# Patient Record
Sex: MALE | Race: WHITE | Employment: STUDENT | URBAN - METROPOLITAN AREA
[De-identification: names, ages, dates, MRNs, and addresses within clinical notes are randomized per-mention and may not be internally consistent; named-entity substitution may affect disease eponyms.]

---
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
## 2021-03-02 ENCOUNTER — OFFICE VISIT (OUTPATIENT)
Dept: UROLOGY | Age: 21
End: 2021-03-02
Payer: COMMERCIAL

## 2021-03-02 VITALS
HEART RATE: 89 BPM | BODY MASS INDEX: 22.53 KG/M2 | WEIGHT: 170 LBS | DIASTOLIC BLOOD PRESSURE: 74 MMHG | HEIGHT: 73 IN | SYSTOLIC BLOOD PRESSURE: 126 MMHG

## 2021-03-02 DIAGNOSIS — R31.29 MICROSCOPIC HEMATURIA: Primary | ICD-10-CM

## 2021-03-02 LAB
BILIRUBIN, POC: ABNORMAL
BLOOD URINE, POC: ABNORMAL
CLARITY, POC: CLEAR
COLOR, POC: YELLOW
GLUCOSE URINE, POC: ABNORMAL
KETONES, POC: ABNORMAL
LEUKOCYTE EST, POC: ABNORMAL
NITRITE, POC: ABNORMAL
PH, POC: 5.5
PROTEIN, POC: ABNORMAL
SPECIFIC GRAVITY, POC: 1.03
UROBILINOGEN, POC: 0.2

## 2021-03-02 PROCEDURE — 81003 URINALYSIS AUTO W/O SCOPE: CPT | Performed by: UROLOGY

## 2021-03-02 PROCEDURE — 99204 OFFICE O/P NEW MOD 45 MIN: CPT | Performed by: UROLOGY

## 2021-03-02 RX ORDER — ACETAMINOPHEN 160 MG
TABLET,DISINTEGRATING ORAL DAILY
COMMUNITY

## 2021-03-02 ASSESSMENT — ENCOUNTER SYMPTOMS
GASTROINTESTINAL NEGATIVE: 1
RESPIRATORY NEGATIVE: 1
ALLERGIC/IMMUNOLOGIC NEGATIVE: 1

## 2021-03-02 NOTE — PROGRESS NOTES
Subjective:      Patient ID: Adan Alejandro is a 21 y.o. male. HPI This is a 22 yo male with prior h/o Asthma and student at [employer] who lives in [place] and is here for evaluation of microhematuria. He saw a Urologist in [place] for this problem and at that time had pain after voiding and some urgency. He had a Ct that was reported \"negative\" and was to have cystoscopy but the symptoms resolved and he returned to college. He was seen recently by his PCP in [place] and found to have persistent microhematuria and is here for evaluation. He has no gross hematuria or dysuria or pain. He has no flank pain and no N/V or F/C. He has a good flow and no PVD or frequency or urgency. He has no splaying of the stream. He has no prior  surgical history. He does not smoke ciggs. He has no family h/o  malignancies. He has no other complaints. He does have issues with cost of trip from [place] and would like to coordinate visits when next seen.      Past Medical History:   Diagnosis Date    Asthma      Past Surgical History:   Procedure Laterality Date    WISDOM TOOTH EXTRACTION       Social History     Socioeconomic History    Marital status: Single     Spouse name: None    Number of children: None    Years of education: None    Highest education level: None   Occupational History    None   Social Needs    Financial resource strain: None    Food insecurity     Worry: None     Inability: None    Transportation needs     Medical: None     Non-medical: None   Tobacco Use    Smoking status: Never Smoker    Smokeless tobacco: Never Used   Substance and Sexual Activity    Alcohol use: Not Currently    Drug use: Never    Sexual activity: Not Currently   Lifestyle    Physical activity     Days per week: None     Minutes per session: None    Stress: None   Relationships    Social connections     Talks on phone: None     Gets together: None     Attends Mormon service: None     Active member of club or organization: None Attends meetings of clubs or organizations: None     Relationship status: None    Intimate partner violence     Fear of current or ex partner: None     Emotionally abused: None     Physically abused: None     Forced sexual activity: None   Other Topics Concern    None   Social History Narrative    None     History reviewed. No pertinent family history. Current Outpatient Medications   Medication Sig Dispense Refill    Cholecalciferol (VITAMIN D3) 50 MCG (2000 UT) CAPS Take by mouth daily       No current facility-administered medications for this visit. Patient has no known allergies. reviewed      Review of Systems   Constitutional: Negative. HENT: Negative. Eyes: Positive for visual disturbance. Respiratory: Negative. Cardiovascular: Negative. Gastrointestinal: Negative. Endocrine: Negative. Genitourinary: Negative for decreased urine volume, difficulty urinating, discharge, dysuria, enuresis, flank pain, frequency, genital sores, penile pain, penile swelling, scrotal swelling, testicular pain and urgency. Musculoskeletal: Negative. Skin: Negative. Allergic/Immunologic: Negative. Neurological: Negative. Hematological: Negative. Does not bruise/bleed easily. Psychiatric/Behavioral: Negative. Objective:   Physical Exam  Constitutional:       Appearance: Normal appearance. HENT:      Head: Normocephalic and atraumatic. Eyes:      Conjunctiva/sclera: Conjunctivae normal.   Neck:      Musculoskeletal: Neck supple. No muscular tenderness. Cardiovascular:      Rate and Rhythm: Normal rate and regular rhythm. Heart sounds: Normal heart sounds. No murmur. Pulmonary:      Effort: Pulmonary effort is normal. No respiratory distress. Breath sounds: Normal breath sounds. No stridor. No wheezing, rhonchi or rales. Abdominal:      General: Abdomen is flat. Bowel sounds are normal. There is no distension. Palpations: Abdomen is soft. There is no mass. Tenderness: There is no abdominal tenderness. There is no right CVA tenderness, left CVA tenderness, guarding or rebound. Hernia: No hernia is present. There is no hernia in the left inguinal area or right inguinal area. Genitourinary:     Penis: Normal and circumcised. No phimosis, paraphimosis, hypospadias, erythema, tenderness, discharge, swelling or lesions. Testes:         Right: Mass, tenderness, swelling, testicular hydrocele or varicocele not present. Right testis is descended. Left: Mass, tenderness, swelling, testicular hydrocele or varicocele not present. Left testis is descended. Epididymis:      Right: Not inflamed or enlarged. No mass or tenderness. Left: Not inflamed or enlarged. No mass or tenderness. Neurological:      Mental Status: He is alert. Psychiatric:         Mood and Affect: Mood normal.         Behavior: Behavior normal.         3/2/2021  9:27 AM - Moses Elaine CMA ([facility])    Component Collected Lab   Color, UA 03/02/2021  9:26 AM Unknown   yellow    Clarity, UA 03/02/2021  9:26 AM Unknown   clear    Glucose, UA POC 03/02/2021  9:26 AM Unknown   neg    Bilirubin, UA 03/02/2021  9:26 AM Unknown   neg    Ketones, UA 03/02/2021  9:26 AM Unknown   trace    Spec Grav, UA 03/02/2021  9:26 AM Unknown   1.030    Blood, UA POC 03/02/2021  9:26 AM Unknown   large    pH, UA 03/02/2021  9:26 AM Unknown   5.5    Protein, UA POC 03/02/2021  9:26 AM Unknown   moderate    Urobilinogen, UA 03/02/2021  9:26 AM Unknown   0.2    Leukocytes, UA 03/02/2021  9:26 AM Unknown   neg    Nitrite, UA 03/02/2021  9:26 AM Unknown   neg    Lab and Collection    POCT Urinalysis No Micro (Auto) - 3/2/2021        Assessment: This is a 22 yo male with prior h/o Asthma and with a 1.5 yr h/o of microhematuria and no specific  symptoms. He does have proteinuria noted on U/A today and I explained this may need evaluation by nephrology as well.  I recommend s full hematuria evaluation including CT Urogram and then cystoscopy. The option of local or MAC was discussed and he wants local due to trouble getting here for visit. If unable to tolerate, will then get it done in [place] under [address] so he has family avaialable. The risks and benefits of cystoscopy including but not limited to infection, pain, bleeding, stricture, retention, perforation, need for multiple procedures and he wants to proceed as planned. Plan:      1. CT Urogram   2.  F/U 1-2 weeks for office cystoscopy, local (pt choice) and visit on same day with Nephrology prior for proteinuria and microhematuria        Sylvia Garcia MD

## 2021-03-09 ENCOUNTER — HOSPITAL ENCOUNTER (OUTPATIENT)
Dept: LAB | Age: 21
Discharge: HOME | End: 2021-03-09
Payer: COMMERCIAL

## 2021-03-09 LAB
ALBUMIN SERPL-MCNC: 4.4 G/DL (ref 3.5–4.6)
ANION GAP SERPL CALCULATED.3IONS-SCNC: 13 MEQ/L (ref 9–15)
BACTERIA: NEGATIVE /HPF
BILIRUBIN URINE: NEGATIVE
BLOOD, URINE: ABNORMAL
BUN BLDV-MCNC: 9 MG/DL (ref 6–20)
CALCIUM SERPL-MCNC: 9.4 MG/DL (ref 8.5–9.9)
CHLORIDE BLD-SCNC: 105 MEQ/L (ref 95–107)
CLARITY: CLEAR
CO2: 24 MEQ/L (ref 20–31)
COLOR: YELLOW
CREAT SERPL-MCNC: 0.82 MG/DL (ref 0.7–1.2)
CREATININE URINE: 222.9 MG/DL
EPITHELIAL CELLS, UA: ABNORMAL /HPF (ref 0–5)
GFR AFRICAN AMERICAN: >60
GFR NON-AFRICAN AMERICAN: >60
GLUCOSE BLD-MCNC: 87 MG/DL (ref 70–99)
GLUCOSE URINE: NEGATIVE MG/DL
HCT VFR BLD CALC: 44 % (ref 42–52)
HEMOGLOBIN: 15 G/DL (ref 14–18)
HYALINE CASTS: ABNORMAL /HPF (ref 0–5)
KETONES, URINE: NEGATIVE MG/DL
LEUKOCYTE ESTERASE, URINE: NEGATIVE
MCH RBC QN AUTO: 28.9 PG (ref 27–31.3)
MCHC RBC AUTO-ENTMCNC: 34.1 % (ref 33–37)
MCV RBC AUTO: 84.5 FL (ref 80–100)
NITRITE, URINE: NEGATIVE
PDW BLD-RTO: 12.6 % (ref 11.5–14.5)
PH UA: 5.5 (ref 5–9)
PHOSPHORUS: 3.5 MG/DL (ref 2.3–4.8)
PLATELET # BLD: 243 K/UL (ref 130–400)
POTASSIUM SERPL-SCNC: 4.4 MEQ/L (ref 3.4–4.9)
PROTEIN PROTEIN: 33 MG/DL
PROTEIN UA: 30 MG/DL
PROTEIN/CREAT RATIO: 0.1 ML/ML
PROTEIN/CREAT RATIO: 0.1 ML/ML (ref 0–0.2)
RBC # BLD: 5.21 M/UL (ref 4.7–6.1)
RBC UA: ABNORMAL /HPF (ref 0–5)
SODIUM BLD-SCNC: 142 MEQ/L (ref 135–144)
SPECIFIC GRAVITY UA: 1.02 (ref 1–1.03)
UROBILINOGEN, URINE: 0.2 E.U./DL
WBC # BLD: 4.3 K/UL (ref 4.5–11)
WBC UA: ABNORMAL /HPF (ref 0–5)

## 2021-03-09 PROCEDURE — 87086 URINE CULTURE/COLONY COUNT: CPT

## 2021-03-09 PROCEDURE — 80069 RENAL FUNCTION PANEL: CPT

## 2021-03-09 PROCEDURE — 85027 COMPLETE CBC AUTOMATED: CPT

## 2021-03-09 PROCEDURE — 36415 COLL VENOUS BLD VENIPUNCTURE: CPT

## 2021-03-09 PROCEDURE — 84156 ASSAY OF PROTEIN URINE: CPT

## 2021-03-09 PROCEDURE — 81001 URINALYSIS AUTO W/SCOPE: CPT

## 2021-03-11 LAB — URINE CULTURE, ROUTINE: NORMAL

## 2021-03-12 ENCOUNTER — HOSPITAL ENCOUNTER (EMERGENCY)
Age: 21
Discharge: ANOTHER HOSPITAL | End: 2021-03-12
Attending: EMERGENCY MEDICINE
Payer: COMMERCIAL

## 2021-03-12 ENCOUNTER — HOSPITAL ENCOUNTER (OUTPATIENT)
Dept: CT IMAGING | Age: 21
Discharge: HOME | End: 2021-03-14
Payer: COMMERCIAL

## 2021-03-12 ENCOUNTER — HOSPITAL ENCOUNTER (OUTPATIENT)
Age: 21
Setting detail: OBSERVATION
Discharge: HOME | End: 2021-03-14
Attending: SURGERY | Admitting: SURGERY
Payer: COMMERCIAL

## 2021-03-12 ENCOUNTER — TELEPHONE (OUTPATIENT)
Dept: UROLOGY | Age: 21
End: 2021-03-12

## 2021-03-12 VITALS
DIASTOLIC BLOOD PRESSURE: 109 MMHG | RESPIRATION RATE: 18 BRPM | HEART RATE: 123 BPM | WEIGHT: 170 LBS | OXYGEN SATURATION: 97 % | SYSTOLIC BLOOD PRESSURE: 151 MMHG | HEIGHT: 72 IN | BODY MASS INDEX: 23.03 KG/M2 | TEMPERATURE: 98.5 F

## 2021-03-12 DIAGNOSIS — K35.30 ACUTE APPENDICITIS WITH LOCALIZED PERITONITIS, WITHOUT PERFORATION, ABSCESS, OR GANGRENE: Primary | ICD-10-CM

## 2021-03-12 DIAGNOSIS — R31.29 MICROSCOPIC HEMATURIA: ICD-10-CM

## 2021-03-12 DIAGNOSIS — R10.31 RIGHT LOWER QUADRANT ABDOMINAL PAIN: Primary | ICD-10-CM

## 2021-03-12 LAB
ALBUMIN SERPL-MCNC: 4.9 G/DL (ref 3.5–4.6)
ALP BLD-CCNC: 93 U/L (ref 35–104)
ALT SERPL-CCNC: 13 U/L (ref 0–41)
ANION GAP SERPL CALCULATED.3IONS-SCNC: 11 MEQ/L (ref 9–15)
AST SERPL-CCNC: 13 U/L (ref 0–40)
BACTERIA: NEGATIVE /HPF
BASOPHILS ABSOLUTE: 0 K/UL (ref 0–0.2)
BASOPHILS RELATIVE PERCENT: 0.4 %
BILIRUB SERPL-MCNC: 0.8 MG/DL (ref 0.2–0.7)
BILIRUBIN URINE: ABNORMAL
BLOOD, URINE: ABNORMAL
BUN BLDV-MCNC: 11 MG/DL (ref 6–20)
CALCIUM SERPL-MCNC: 10 MG/DL (ref 8.5–9.9)
CHLORIDE BLD-SCNC: 100 MEQ/L (ref 95–107)
CLARITY: ABNORMAL
CO2: 28 MEQ/L (ref 20–31)
COLOR: ABNORMAL
CREAT SERPL-MCNC: 0.99 MG/DL (ref 0.7–1.2)
EOSINOPHILS ABSOLUTE: 0.2 K/UL (ref 0–0.7)
EOSINOPHILS RELATIVE PERCENT: 3 %
EPITHELIAL CELLS, UA: ABNORMAL /HPF
GFR AFRICAN AMERICAN: >60
GFR NON-AFRICAN AMERICAN: >60
GLOBULIN: 3.1 G/DL (ref 2.3–3.5)
GLUCOSE BLD-MCNC: 119 MG/DL (ref 70–99)
GLUCOSE URINE: NEGATIVE MG/DL
HCT VFR BLD CALC: 43.7 % (ref 42–52)
HEMOGLOBIN: 15.1 G/DL (ref 14–18)
KETONES, URINE: NEGATIVE MG/DL
LEUKOCYTE ESTERASE, URINE: NEGATIVE
LYMPHOCYTES ABSOLUTE: 1.6 K/UL (ref 1–4.8)
LYMPHOCYTES RELATIVE PERCENT: 19.4 %
MCH RBC QN AUTO: 28.8 PG (ref 27–31.3)
MCHC RBC AUTO-ENTMCNC: 34.6 % (ref 33–37)
MCV RBC AUTO: 83.3 FL (ref 80–100)
MONOCYTES ABSOLUTE: 0.9 K/UL (ref 0.2–0.8)
MONOCYTES RELATIVE PERCENT: 10.6 %
NEUTROPHILS ABSOLUTE: 5.3 K/UL (ref 1.4–6.5)
NEUTROPHILS RELATIVE PERCENT: 66.6 %
NITRITE, URINE: NEGATIVE
PDW BLD-RTO: 11.8 % (ref 11.5–14.5)
PH UA: 6 (ref 5–9)
PLATELET # BLD: 258 K/UL (ref 130–400)
POTASSIUM SERPL-SCNC: 3.9 MEQ/L (ref 3.4–4.9)
PROTEIN UA: >=300 MG/DL
RBC # BLD: 5.25 M/UL (ref 4.7–6.1)
RBC UA: >100 /HPF (ref 0–2)
SARS-COV-2, NAAT: NOT DETECTED
SEDIMENTATION RATE, ERYTHROCYTE: 14 MM (ref 0–10)
SODIUM BLD-SCNC: 139 MEQ/L (ref 135–144)
SPECIFIC GRAVITY UA: 1.01 (ref 1–1.03)
TOTAL PROTEIN: 8 G/DL (ref 6.3–8)
URINE REFLEX TO CULTURE: ABNORMAL
UROBILINOGEN, URINE: 0.2 E.U./DL
WBC # BLD: 8 K/UL (ref 4.5–11)
WBC UA: ABNORMAL /HPF (ref 0–5)

## 2021-03-12 PROCEDURE — 2580000003 HC RX 258: Performed by: EMERGENCY MEDICINE

## 2021-03-12 PROCEDURE — 6360000002 HC RX W HCPCS: Performed by: SURGERY

## 2021-03-12 PROCEDURE — 85652 RBC SED RATE AUTOMATED: CPT

## 2021-03-12 PROCEDURE — 87635 SARS-COV-2 COVID-19 AMP PRB: CPT

## 2021-03-12 PROCEDURE — 81001 URINALYSIS AUTO W/SCOPE: CPT

## 2021-03-12 PROCEDURE — G0378 HOSPITAL OBSERVATION PER HR: HCPCS

## 2021-03-12 PROCEDURE — 96365 THER/PROPH/DIAG IV INF INIT: CPT

## 2021-03-12 PROCEDURE — 74178 CT ABD&PLV WO CNTR FLWD CNTR: CPT

## 2021-03-12 PROCEDURE — 6360000004 HC RX CONTRAST MEDICATION: Performed by: UROLOGY

## 2021-03-12 PROCEDURE — 99284 EMERGENCY DEPT VISIT MOD MDM: CPT

## 2021-03-12 PROCEDURE — 6370000000 HC RX 637 (ALT 250 FOR IP): Performed by: SURGERY

## 2021-03-12 PROCEDURE — 36415 COLL VENOUS BLD VENIPUNCTURE: CPT

## 2021-03-12 PROCEDURE — 80053 COMPREHEN METABOLIC PANEL: CPT

## 2021-03-12 PROCEDURE — 85025 COMPLETE CBC W/AUTO DIFF WBC: CPT

## 2021-03-12 PROCEDURE — 2580000003 HC RX 258: Performed by: SURGERY

## 2021-03-12 PROCEDURE — G0379 DIRECT REFER HOSPITAL OBSERV: HCPCS

## 2021-03-12 RX ORDER — 0.9 % SODIUM CHLORIDE 0.9 %
500 INTRAVENOUS SOLUTION INTRAVENOUS ONCE
Status: COMPLETED | OUTPATIENT
Start: 2021-03-12 | End: 2021-03-12

## 2021-03-12 RX ORDER — ACETAMINOPHEN 325 MG/1
650 TABLET ORAL EVERY 6 HOURS PRN
Status: DISCONTINUED | OUTPATIENT
Start: 2021-03-12 | End: 2021-03-14 | Stop reason: HOSPADM

## 2021-03-12 RX ORDER — SODIUM CHLORIDE 9 MG/ML
INJECTION, SOLUTION INTRAVENOUS CONTINUOUS
Status: DISCONTINUED | OUTPATIENT
Start: 2021-03-12 | End: 2021-03-12 | Stop reason: HOSPADM

## 2021-03-12 RX ORDER — MORPHINE SULFATE 2 MG/ML
1 INJECTION, SOLUTION INTRAMUSCULAR; INTRAVENOUS EVERY 4 HOURS PRN
Status: DISCONTINUED | OUTPATIENT
Start: 2021-03-12 | End: 2021-03-13

## 2021-03-12 RX ORDER — CLINDAMYCIN PHOSPHATE AND TRETINOIN 10; .25 MG/G; MG/G
GEL TOPICAL DAILY
COMMUNITY

## 2021-03-12 RX ORDER — DEXTROSE AND SODIUM CHLORIDE 5; .45 G/100ML; G/100ML
INJECTION, SOLUTION INTRAVENOUS CONTINUOUS
Status: DISCONTINUED | OUTPATIENT
Start: 2021-03-12 | End: 2021-03-13

## 2021-03-12 RX ORDER — ONDANSETRON 2 MG/ML
4 INJECTION INTRAMUSCULAR; INTRAVENOUS EVERY 6 HOURS PRN
Status: DISCONTINUED | OUTPATIENT
Start: 2021-03-12 | End: 2021-03-13

## 2021-03-12 RX ADMIN — SODIUM CHLORIDE 100 ML/HR: 9 INJECTION, SOLUTION INTRAVENOUS at 19:35

## 2021-03-12 RX ADMIN — PIPERACILLIN AND TAZOBACTAM 3375 MG: 3; .375 INJECTION, POWDER, FOR SOLUTION INTRAVENOUS at 22:38

## 2021-03-12 RX ADMIN — SODIUM CHLORIDE 500 ML: 9 INJECTION, SOLUTION INTRAVENOUS at 16:54

## 2021-03-12 RX ADMIN — DEXTROSE AND SODIUM CHLORIDE: 5; 450 INJECTION, SOLUTION INTRAVENOUS at 22:37

## 2021-03-12 RX ADMIN — ACETAMINOPHEN 650 MG: 325 TABLET ORAL at 22:38

## 2021-03-12 RX ADMIN — IOPAMIDOL 100 ML: 755 INJECTION, SOLUTION INTRAVENOUS at 13:01

## 2021-03-12 ASSESSMENT — ENCOUNTER SYMPTOMS
STRIDOR: 0
WHEEZING: 0
CHEST TIGHTNESS: 0
DIARRHEA: 0
SORE THROAT: 0
COUGH: 0
SHORTNESS OF BREATH: 0
FACIAL SWELLING: 0
VOICE CHANGE: 0
EYE DISCHARGE: 0
BACK PAIN: 0
ABDOMINAL PAIN: 1
CHOKING: 0
SINUS PRESSURE: 0
EYE PAIN: 0
CONSTIPATION: 0
VOMITING: 0
EYE REDNESS: 0
TROUBLE SWALLOWING: 0
BLOOD IN STOOL: 0

## 2021-03-12 ASSESSMENT — PAIN SCALES - GENERAL: PAINLEVEL_OUTOF10: 4

## 2021-03-12 NOTE — ED NOTES
Elier Gamez from transfer center called, transport will be by [facility], approx. ETA 8:45pm (earliest transport available).   Garrett Rodrigues  03/12/21 4192

## 2021-03-12 NOTE — ED PROVIDER NOTES
[facility] ED  eMERGENCY dEPARTMENT eNCOUnter      Pt Name: Dioni Griffith  MRN: [id number]  [place] 2000  Date of evaluation: 3/12/2021  Provider: Tiffany Kruger MD    [address] [place] [address]       Chief Complaint   Patient presents with    Abdominal Pain     sent by his PCP-possible appendicitis       HISTORY OF PRESENT ILLNESS   (Location/Symptom, Timing/Onset,Context/Setting, Quality, Duration, Modifying Factors, Severity)  Note limiting factors. Dioni Griffith is a 21 y.o. male who presents to the emergency department patient is a student from the [employer] originally from [facility] where he saw his doctor who ordered some urine check and referred to urology because of urinary symptoms no fever no chills no previous abdominal surgery denies any penile discharge patient underwent a CAT scan which was read as a suspected appendicitis and his doctors advised him to go to the emergency so patient came here for further evaluation has abdominal discomfort for the last 3 days time but eating drinking fine no nausea no vomiting no fever no chills no change in taste or smell no contact with any coronavirus    HPI    NursingNotes were reviewed. REVIEW OF SYSTEMS    (2-9 systems for level 4, 10 or more for level 5)     Review of Systems   Constitutional: Negative. Negative for activity change and fever. HENT: Negative for congestion, drooling, facial swelling, mouth sores, nosebleeds, sinus pressure, sore throat, trouble swallowing and voice change. Eyes: Negative for pain, discharge, redness and visual disturbance. Respiratory: Negative for cough, choking, chest tightness, shortness of breath, wheezing and stridor. Cardiovascular: Negative for chest pain, palpitations and leg swelling. Gastrointestinal: Positive for abdominal pain. Negative for blood in stool, constipation, diarrhea and vomiting. Endocrine: Negative for cold intolerance, polyphagia and polyuria.    Genitourinary: Negative for Active member of club or organization: None     Attends meetings of clubs or organizations: None     Relationship status: None    Intimate partner violence     Fear of current or ex partner: None     Emotionally abused: None     Physically abused: None     Forced sexual activity: None   Other Topics Concern    None   Social History Narrative    None       SCREENINGS      @FLOW([id number])@      PHYSICAL EXAM    (up to 7 for level 4, 8 or more for level 5)     ED Triage Vitals [03/12/21 1620]   BP Temp Temp Source Pulse Resp SpO2 Height Weight   (!) 151/109 98.5 °F (36.9 °C) Oral 123 18 97 % 6' (1.829 m) 170 lb (77.1 kg)       Physical Exam  Vitals signs and nursing note reviewed. Constitutional:       General: He is in acute distress. Appearance: He is well-developed. He is not ill-appearing, toxic-appearing or diaphoretic. Comments: Alert cooperative patient slightly anxious this time uncomfortable because of the upper abdominal pain   HENT:      Head: Normocephalic and atraumatic. Mouth/Throat:      Pharynx: Oropharynx is clear. No pharyngeal swelling. Eyes:      General: No scleral icterus. Extraocular Movements: Extraocular movements intact. Pupils: Pupils are equal, round, and reactive to light. Neck:      Musculoskeletal: Neck supple. Cardiovascular:      Rate and Rhythm: Normal rate and regular rhythm. Heart sounds: Normal heart sounds. No murmur. No friction rub. No gallop. Pulmonary:      Effort: No respiratory distress. Breath sounds: Normal breath sounds. No wheezing. Abdominal:      General: Abdomen is flat. Bowel sounds are normal. There is no distension or abdominal bruit. Palpations: Abdomen is soft. There is no shifting dullness, fluid wave, hepatomegaly, splenomegaly, mass or pulsatile mass. Tenderness: There is abdominal tenderness in the epigastric area and left upper quadrant. There is no right CVA tenderness, guarding or rebound. Negative signs include Espinoza's sign, McBurney's sign and psoas sign. Hernia: There is no hernia in the umbilical area, ventral area or left femoral area. Musculoskeletal: Normal range of motion. General: No tenderness. Skin:     General: Skin is warm. Capillary Refill: Capillary refill takes less than 2 seconds. Findings: No erythema or rash. Neurological:      General: No focal deficit present. Mental Status: He is alert and oriented to person, place, and time. Cranial Nerves: No cranial nerve deficit. Motor: No abnormal muscle tone. Psychiatric:         Behavior: Behavior normal.         Thought Content: Thought content normal.         DIAGNOSTIC RESULTS     EKG: All EKG's are interpreted by the Emergency Department Physician who either signs or Co-signsthis chart in the absence of a cardiologist.        RADIOLOGY:   [place] such as CT, Ultrasound and MRI are read by the radiologist. Doris Mcgregor radiographic images are visualized and preliminarily interpreted by the emergency physician with the below findings:        Interpretation per the Radiologist below, if available at the time ofthis note:    No orders to display         ED BEDSIDE ULTRASOUND:   Performed by ED Physician - none    LABS:  Labs Reviewed   COMPREHENSIVE METABOLIC PANEL - Abnormal; Notable for the following components:       Result Value    Glucose 119 (*)     Calcium 10.0 (*)     Albumin 4.9 (*)     Total Bilirubin 0.8 (*)     All other components within normal limits   CBC WITH AUTO DIFFERENTIAL - Abnormal; Notable for the following components:    Monocytes Absolute 0.9 (*)     All other components within normal limits   COVID-19, RAPID   URINE RT REFLEX TO CULTURE       All other labs were within normal range or not returned as of this dictation.     EMERGENCY DEPARTMENT COURSE and DIFFERENTIAL DIAGNOSIS/MDM:   Vitals:    Vitals:    03/12/21 1620   BP: (!) 151/109   Pulse: 123   Resp: 18 Temp: 98.5 °F (36.9 °C)   TempSrc: Oral   SpO2: 97%   Weight: 170 lb (77.1 kg)   Height: 6' (1.829 m)           MDM  Number of Diagnoses or Management Options  Right lower quadrant abdominal pain  Diagnosis management comments: Patient resting comfortably at this time no acute distress on physical examination patient has mild deep palpation and has tenderness to the right lower abdomen no rebound tenderness at this time no CVA tenderness Case discussed with the surgeon on-call Dr. Pastora Antonio, patient to be transferred to [facility] in observation status on MedSurg bed transfer center involved patient aware of the situation       Amount and/or Complexity of Data Reviewed  Clinical lab tests: reviewed and ordered        CRITICAL CARE TIME   Total Critical Care time was minutes, excluding separately reportableprocedures. There was a high probability of clinicallysignificant/life threatening deterioration in the patient's condition which required my urgent intervention. ONSULTS:  None    PROCEDURES:  Unless otherwise noted below, none     Procedures    FINAL IMPRESSION      1. Right lower quadrant abdominal pain          DISPOSITION/PLAN   DISPOSITION        PATIENT REFERRED TO:  No follow-up provider specified.     DISCHARGE MEDICATIONS:  New Prescriptions    No medications on file          (Please note that portions of this note were completed with a voice recognition program.  Efforts were made to edit the dictations but occasionally words are mis-transcribed.)    Miesha Mcnally MD (electronically signed)  Attending Emergency Physician       Miesha Mcnally MD  03/12/21 8190       Miesha Mcnally MD  03/12/21 8004

## 2021-03-12 NOTE — ED NOTES
Yareli from [facility] called with Dr. Diane Hook on the phone for Dr. Kim Eisenmenger.   Dara Lopez      Bety Monroy  03/12/21 8303

## 2021-03-12 NOTE — ED NOTES
Called transfer center , talked to Jeff Mitchell,  She will page Dr. Niels Victor for Dr. Nalini Boswell for admission to [facility].   [facility] [place] Edyta Moon  03/12/21 [address] [facility]  03/12/21 4329

## 2021-03-12 NOTE — ED TRIAGE NOTES
Pt arrives to ED, from [facility]. Pt was sent to ED at the advice of his PCP d/t abnormalities detected on a CT of his abdomen. CT was completed today.

## 2021-03-13 ENCOUNTER — ANESTHESIA (OUTPATIENT)
Dept: OPERATING ROOM | Age: 21
End: 2021-03-13
Payer: COMMERCIAL

## 2021-03-13 ENCOUNTER — ANESTHESIA EVENT (OUTPATIENT)
Dept: OPERATING ROOM | Age: 21
End: 2021-03-13
Payer: COMMERCIAL

## 2021-03-13 VITALS
OXYGEN SATURATION: 100 % | SYSTOLIC BLOOD PRESSURE: 140 MMHG | RESPIRATION RATE: 8 BRPM | TEMPERATURE: 65.3 F | DIASTOLIC BLOOD PRESSURE: 78 MMHG

## 2021-03-13 LAB
HCT VFR BLD CALC: 40.9 % (ref 42–52)
HEMOGLOBIN: 14.2 G/DL (ref 14–18)
MCH RBC QN AUTO: 28.9 PG (ref 27–31.3)
MCHC RBC AUTO-ENTMCNC: 34.7 % (ref 33–37)
MCV RBC AUTO: 83.2 FL (ref 80–100)
PDW BLD-RTO: 12.4 % (ref 11.5–14.5)
PLATELET # BLD: 212 K/UL (ref 130–400)
RBC # BLD: 4.91 M/UL (ref 4.7–6.1)
WBC # BLD: 5.9 K/UL (ref 4.5–11)

## 2021-03-13 PROCEDURE — 2580000003 HC RX 258: Performed by: SURGERY

## 2021-03-13 PROCEDURE — 36415 COLL VENOUS BLD VENIPUNCTURE: CPT

## 2021-03-13 PROCEDURE — 2580000003 HC RX 258: Performed by: STUDENT IN AN ORGANIZED HEALTH CARE EDUCATION/TRAINING PROGRAM

## 2021-03-13 PROCEDURE — 7100000001 HC PACU RECOVERY - ADDTL 15 MIN: Performed by: SURGERY

## 2021-03-13 PROCEDURE — 88304 TISSUE EXAM BY PATHOLOGIST: CPT

## 2021-03-13 PROCEDURE — 6360000002 HC RX W HCPCS: Performed by: STUDENT IN AN ORGANIZED HEALTH CARE EDUCATION/TRAINING PROGRAM

## 2021-03-13 PROCEDURE — 96366 THER/PROPH/DIAG IV INF ADDON: CPT

## 2021-03-13 PROCEDURE — 3600000004 HC SURGERY LEVEL 4 BASE: Performed by: SURGERY

## 2021-03-13 PROCEDURE — 3700000000 HC ANESTHESIA ATTENDED CARE: Performed by: SURGERY

## 2021-03-13 PROCEDURE — 3700000001 HC ADD 15 MINUTES (ANESTHESIA): Performed by: SURGERY

## 2021-03-13 PROCEDURE — 96375 TX/PRO/DX INJ NEW DRUG ADDON: CPT

## 2021-03-13 PROCEDURE — 2500000003 HC RX 250 WO HCPCS: Performed by: STUDENT IN AN ORGANIZED HEALTH CARE EDUCATION/TRAINING PROGRAM

## 2021-03-13 PROCEDURE — 99219 PR INITIAL OBSERVATION CARE/DAY 50 MINUTES: CPT | Performed by: SURGERY

## 2021-03-13 PROCEDURE — 2720000010 HC SURG SUPPLY STERILE: Performed by: SURGERY

## 2021-03-13 PROCEDURE — 2709999900 HC NON-CHARGEABLE SUPPLY: Performed by: SURGERY

## 2021-03-13 PROCEDURE — 6370000000 HC RX 637 (ALT 250 FOR IP): Performed by: SURGERY

## 2021-03-13 PROCEDURE — 7100000000 HC PACU RECOVERY - FIRST 15 MIN: Performed by: SURGERY

## 2021-03-13 PROCEDURE — 85027 COMPLETE CBC AUTOMATED: CPT

## 2021-03-13 PROCEDURE — G0378 HOSPITAL OBSERVATION PER HR: HCPCS

## 2021-03-13 PROCEDURE — 64488 TAP BLOCK BI INJECTION: CPT | Performed by: STUDENT IN AN ORGANIZED HEALTH CARE EDUCATION/TRAINING PROGRAM

## 2021-03-13 PROCEDURE — 3600000014 HC SURGERY LEVEL 4 ADDTL 15MIN: Performed by: SURGERY

## 2021-03-13 PROCEDURE — 44970 LAPAROSCOPY APPENDECTOMY: CPT | Performed by: SURGERY

## 2021-03-13 PROCEDURE — 6360000002 HC RX W HCPCS: Performed by: SURGERY

## 2021-03-13 RX ORDER — SODIUM CHLORIDE 0.9 % (FLUSH) 0.9 %
10 SYRINGE (ML) INJECTION PRN
Status: DISCONTINUED | OUTPATIENT
Start: 2021-03-13 | End: 2021-03-14 | Stop reason: HOSPADM

## 2021-03-13 RX ORDER — TRAMADOL HYDROCHLORIDE 50 MG/1
50 TABLET ORAL EVERY 6 HOURS PRN
Status: DISCONTINUED | OUTPATIENT
Start: 2021-03-13 | End: 2021-03-14 | Stop reason: HOSPADM

## 2021-03-13 RX ORDER — PROPOFOL 10 MG/ML
INJECTION, EMULSION INTRAVENOUS PRN
Status: DISCONTINUED | OUTPATIENT
Start: 2021-03-13 | End: 2021-03-13 | Stop reason: SDUPTHER

## 2021-03-13 RX ORDER — SUCCINYLCHOLINE/SOD CL,ISO/PF 100 MG/5ML
SYRINGE (ML) INTRAVENOUS PRN
Status: DISCONTINUED | OUTPATIENT
Start: 2021-03-13 | End: 2021-03-13 | Stop reason: SDUPTHER

## 2021-03-13 RX ORDER — MEPERIDINE HYDROCHLORIDE 25 MG/ML
12.5 INJECTION INTRAMUSCULAR; INTRAVENOUS; SUBCUTANEOUS EVERY 5 MIN PRN
Status: DISCONTINUED | OUTPATIENT
Start: 2021-03-13 | End: 2021-03-13

## 2021-03-13 RX ORDER — MIDAZOLAM HYDROCHLORIDE 2 MG/2ML
INJECTION, SOLUTION INTRAMUSCULAR; INTRAVENOUS PRN
Status: DISCONTINUED | OUTPATIENT
Start: 2021-03-13 | End: 2021-03-13 | Stop reason: SDUPTHER

## 2021-03-13 RX ORDER — ONDANSETRON 2 MG/ML
INJECTION INTRAMUSCULAR; INTRAVENOUS PRN
Status: DISCONTINUED | OUTPATIENT
Start: 2021-03-13 | End: 2021-03-13 | Stop reason: SDUPTHER

## 2021-03-13 RX ORDER — DEXAMETHASONE SODIUM PHOSPHATE 10 MG/ML
INJECTION INTRAMUSCULAR; INTRAVENOUS PRN
Status: DISCONTINUED | OUTPATIENT
Start: 2021-03-13 | End: 2021-03-13 | Stop reason: SDUPTHER

## 2021-03-13 RX ORDER — ONDANSETRON 2 MG/ML
4 INJECTION INTRAMUSCULAR; INTRAVENOUS EVERY 6 HOURS PRN
Status: DISCONTINUED | OUTPATIENT
Start: 2021-03-13 | End: 2021-03-14 | Stop reason: HOSPADM

## 2021-03-13 RX ORDER — MAGNESIUM HYDROXIDE 1200 MG/15ML
LIQUID ORAL CONTINUOUS PRN
Status: COMPLETED | OUTPATIENT
Start: 2021-03-13 | End: 2021-03-13

## 2021-03-13 RX ORDER — FENTANYL CITRATE 50 UG/ML
50 INJECTION, SOLUTION INTRAMUSCULAR; INTRAVENOUS EVERY 10 MIN PRN
Status: DISCONTINUED | OUTPATIENT
Start: 2021-03-13 | End: 2021-03-13

## 2021-03-13 RX ORDER — ONDANSETRON 2 MG/ML
4 INJECTION INTRAMUSCULAR; INTRAVENOUS
Status: DISCONTINUED | OUTPATIENT
Start: 2021-03-13 | End: 2021-03-13

## 2021-03-13 RX ORDER — SODIUM CHLORIDE, SODIUM LACTATE, POTASSIUM CHLORIDE, CALCIUM CHLORIDE 600; 310; 30; 20 MG/100ML; MG/100ML; MG/100ML; MG/100ML
INJECTION, SOLUTION INTRAVENOUS
Status: COMPLETED
Start: 2021-03-13 | End: 2021-03-13

## 2021-03-13 RX ORDER — SODIUM CHLORIDE 0.9 % (FLUSH) 0.9 %
10 SYRINGE (ML) INJECTION EVERY 12 HOURS SCHEDULED
Status: DISCONTINUED | OUTPATIENT
Start: 2021-03-13 | End: 2021-03-14 | Stop reason: HOSPADM

## 2021-03-13 RX ORDER — ROCURONIUM BROMIDE 10 MG/ML
INJECTION, SOLUTION INTRAVENOUS PRN
Status: DISCONTINUED | OUTPATIENT
Start: 2021-03-13 | End: 2021-03-13 | Stop reason: SDUPTHER

## 2021-03-13 RX ORDER — SODIUM CHLORIDE 0.9 % (FLUSH) 0.9 %
10 SYRINGE (ML) INJECTION PRN
Status: CANCELLED | OUTPATIENT
Start: 2021-03-13

## 2021-03-13 RX ORDER — KETOROLAC TROMETHAMINE 30 MG/ML
INJECTION, SOLUTION INTRAMUSCULAR; INTRAVENOUS PRN
Status: DISCONTINUED | OUTPATIENT
Start: 2021-03-13 | End: 2021-03-13 | Stop reason: SDUPTHER

## 2021-03-13 RX ORDER — DIPHENHYDRAMINE HYDROCHLORIDE 50 MG/ML
12.5 INJECTION INTRAMUSCULAR; INTRAVENOUS
Status: DISCONTINUED | OUTPATIENT
Start: 2021-03-13 | End: 2021-03-13

## 2021-03-13 RX ORDER — SODIUM CHLORIDE 0.9 % (FLUSH) 0.9 %
10 SYRINGE (ML) INJECTION EVERY 12 HOURS SCHEDULED
Status: CANCELLED | OUTPATIENT
Start: 2021-03-13

## 2021-03-13 RX ORDER — HYDROCODONE BITARTRATE AND ACETAMINOPHEN 5; 325 MG/1; MG/1
1 TABLET ORAL PRN
Status: DISCONTINUED | OUTPATIENT
Start: 2021-03-13 | End: 2021-03-13

## 2021-03-13 RX ORDER — KETOROLAC TROMETHAMINE 15 MG/ML
15 INJECTION, SOLUTION INTRAMUSCULAR; INTRAVENOUS EVERY 8 HOURS PRN
Status: DISCONTINUED | OUTPATIENT
Start: 2021-03-13 | End: 2021-03-14 | Stop reason: HOSPADM

## 2021-03-13 RX ORDER — ROPIVACAINE HYDROCHLORIDE 5 MG/ML
INJECTION, SOLUTION EPIDURAL; INFILTRATION; PERINEURAL
Status: COMPLETED | OUTPATIENT
Start: 2021-03-13 | End: 2021-03-13

## 2021-03-13 RX ORDER — SODIUM CHLORIDE, SODIUM LACTATE, POTASSIUM CHLORIDE, CALCIUM CHLORIDE 600; 310; 30; 20 MG/100ML; MG/100ML; MG/100ML; MG/100ML
INJECTION, SOLUTION INTRAVENOUS CONTINUOUS
Status: CANCELLED | OUTPATIENT
Start: 2021-03-13

## 2021-03-13 RX ORDER — FENTANYL CITRATE 50 UG/ML
INJECTION, SOLUTION INTRAMUSCULAR; INTRAVENOUS PRN
Status: DISCONTINUED | OUTPATIENT
Start: 2021-03-13 | End: 2021-03-13 | Stop reason: SDUPTHER

## 2021-03-13 RX ORDER — LIDOCAINE HYDROCHLORIDE AND EPINEPHRINE 10; 10 MG/ML; UG/ML
INJECTION, SOLUTION INFILTRATION; PERINEURAL PRN
Status: DISCONTINUED | OUTPATIENT
Start: 2021-03-13 | End: 2021-03-13 | Stop reason: SDUPTHER

## 2021-03-13 RX ORDER — SODIUM CHLORIDE, SODIUM LACTATE, POTASSIUM CHLORIDE, CALCIUM CHLORIDE 600; 310; 30; 20 MG/100ML; MG/100ML; MG/100ML; MG/100ML
INJECTION, SOLUTION INTRAVENOUS CONTINUOUS
Status: DISCONTINUED | OUTPATIENT
Start: 2021-03-13 | End: 2021-03-14 | Stop reason: HOSPADM

## 2021-03-13 RX ORDER — LIDOCAINE HYDROCHLORIDE 10 MG/ML
1 INJECTION, SOLUTION EPIDURAL; INFILTRATION; INTRACAUDAL; PERINEURAL
Status: CANCELLED | OUTPATIENT
Start: 2021-03-13 | End: 2021-03-13

## 2021-03-13 RX ORDER — SODIUM CHLORIDE, SODIUM LACTATE, POTASSIUM CHLORIDE, CALCIUM CHLORIDE 600; 310; 30; 20 MG/100ML; MG/100ML; MG/100ML; MG/100ML
INJECTION, SOLUTION INTRAVENOUS CONTINUOUS PRN
Status: DISCONTINUED | OUTPATIENT
Start: 2021-03-13 | End: 2021-03-13 | Stop reason: SDUPTHER

## 2021-03-13 RX ORDER — HYDROCODONE BITARTRATE AND ACETAMINOPHEN 5; 325 MG/1; MG/1
2 TABLET ORAL PRN
Status: DISCONTINUED | OUTPATIENT
Start: 2021-03-13 | End: 2021-03-13

## 2021-03-13 RX ORDER — PROMETHAZINE HYDROCHLORIDE 12.5 MG/1
12.5 TABLET ORAL EVERY 6 HOURS PRN
Status: DISCONTINUED | OUTPATIENT
Start: 2021-03-13 | End: 2021-03-14 | Stop reason: HOSPADM

## 2021-03-13 RX ORDER — METOCLOPRAMIDE HYDROCHLORIDE 5 MG/ML
10 INJECTION INTRAMUSCULAR; INTRAVENOUS
Status: DISCONTINUED | OUTPATIENT
Start: 2021-03-13 | End: 2021-03-13

## 2021-03-13 RX ADMIN — FENTANYL CITRATE 50 MCG: 50 INJECTION, SOLUTION INTRAMUSCULAR; INTRAVENOUS at 11:27

## 2021-03-13 RX ADMIN — SODIUM CHLORIDE, POTASSIUM CHLORIDE, SODIUM LACTATE AND CALCIUM CHLORIDE: 600; 310; 30; 20 INJECTION, SOLUTION INTRAVENOUS at 20:34

## 2021-03-13 RX ADMIN — LIDOCAINE HYDROCHLORIDE AND EPINEPHRINE 10 ML: 10; 10 INJECTION, SOLUTION INFILTRATION; PERINEURAL at 11:12

## 2021-03-13 RX ADMIN — KETOROLAC TROMETHAMINE 30 MG: 30 INJECTION, SOLUTION INTRAMUSCULAR; INTRAVENOUS at 11:30

## 2021-03-13 RX ADMIN — SODIUM CHLORIDE, POTASSIUM CHLORIDE, SODIUM LACTATE AND CALCIUM CHLORIDE: 600; 310; 30; 20 INJECTION, SOLUTION INTRAVENOUS at 15:41

## 2021-03-13 RX ADMIN — PROPOFOL 200 MG: 10 INJECTION, EMULSION INTRAVENOUS at 10:59

## 2021-03-13 RX ADMIN — FENTANYL CITRATE 25 MCG: 50 INJECTION, SOLUTION INTRAMUSCULAR; INTRAVENOUS at 12:42

## 2021-03-13 RX ADMIN — ROCURONIUM BROMIDE 45 MG: 10 INJECTION INTRAVENOUS at 11:04

## 2021-03-13 RX ADMIN — KETOROLAC TROMETHAMINE 15 MG: 15 INJECTION, SOLUTION INTRAMUSCULAR; INTRAVENOUS at 16:56

## 2021-03-13 RX ADMIN — ACETAMINOPHEN 650 MG: 325 TABLET ORAL at 22:17

## 2021-03-13 RX ADMIN — SUGAMMADEX 200 MG: 100 INJECTION, SOLUTION INTRAVENOUS at 11:45

## 2021-03-13 RX ADMIN — PIPERACILLIN AND TAZOBACTAM 3375 MG: 3; .375 INJECTION, POWDER, FOR SOLUTION INTRAVENOUS at 06:07

## 2021-03-13 RX ADMIN — ROPIVACAINE HYDROCHLORIDE 30 ML: 5 INJECTION, SOLUTION EPIDURAL; INFILTRATION; PERINEURAL at 11:10

## 2021-03-13 RX ADMIN — TRAMADOL HYDROCHLORIDE 50 MG: 50 TABLET, FILM COATED ORAL at 20:25

## 2021-03-13 RX ADMIN — ONDANSETRON 4 MG: 2 INJECTION INTRAMUSCULAR; INTRAVENOUS at 11:30

## 2021-03-13 RX ADMIN — Medication 100 MG: at 10:59

## 2021-03-13 RX ADMIN — MIDAZOLAM HYDROCHLORIDE 2 MG: 1 INJECTION, SOLUTION INTRAMUSCULAR; INTRAVENOUS at 10:56

## 2021-03-13 RX ADMIN — ROCURONIUM BROMIDE 5 MG: 10 INJECTION INTRAVENOUS at 10:59

## 2021-03-13 RX ADMIN — FENTANYL CITRATE 50 MCG: 50 INJECTION, SOLUTION INTRAMUSCULAR; INTRAVENOUS at 10:59

## 2021-03-13 RX ADMIN — SODIUM CHLORIDE, POTASSIUM CHLORIDE, SODIUM LACTATE AND CALCIUM CHLORIDE: 600; 310; 30; 20 INJECTION, SOLUTION INTRAVENOUS at 08:59

## 2021-03-13 RX ADMIN — FENTANYL CITRATE 25 MCG: 50 INJECTION, SOLUTION INTRAMUSCULAR; INTRAVENOUS at 13:07

## 2021-03-13 RX ADMIN — DEXAMETHASONE SODIUM PHOSPHATE 10 MG: 10 INJECTION INTRAMUSCULAR; INTRAVENOUS at 11:13

## 2021-03-13 RX ADMIN — SODIUM CHLORIDE, POTASSIUM CHLORIDE, SODIUM LACTATE AND CALCIUM CHLORIDE: 600; 310; 30; 20 INJECTION, SOLUTION INTRAVENOUS at 11:45

## 2021-03-13 RX ADMIN — MORPHINE SULFATE 1 MG: 2 INJECTION, SOLUTION INTRAMUSCULAR; INTRAVENOUS at 06:18

## 2021-03-13 RX ADMIN — SODIUM CHLORIDE, PRESERVATIVE FREE 10 ML: 5 INJECTION INTRAVENOUS at 20:26

## 2021-03-13 RX ADMIN — TRAMADOL HYDROCHLORIDE 50 MG: 50 TABLET, FILM COATED ORAL at 14:01

## 2021-03-13 ASSESSMENT — PULMONARY FUNCTION TESTS
PIF_VALUE: 1
PIF_VALUE: 14
PIF_VALUE: 0
PIF_VALUE: 18
PIF_VALUE: 2
PIF_VALUE: 15
PIF_VALUE: 2
PIF_VALUE: 13
PIF_VALUE: 18
PIF_VALUE: 13
PIF_VALUE: 13
PIF_VALUE: 18
PIF_VALUE: 17
PIF_VALUE: 0
PIF_VALUE: 17
PIF_VALUE: 11
PIF_VALUE: 13
PIF_VALUE: 11
PIF_VALUE: 11
PIF_VALUE: 14
PIF_VALUE: 17
PIF_VALUE: 8
PIF_VALUE: 6
PIF_VALUE: 1
PIF_VALUE: 13
PIF_VALUE: 13
PIF_VALUE: 11
PIF_VALUE: 14
PIF_VALUE: 2
PIF_VALUE: 13
PIF_VALUE: 14
PIF_VALUE: 19

## 2021-03-13 ASSESSMENT — PAIN DESCRIPTION - LOCATION
LOCATION: ABDOMEN

## 2021-03-13 ASSESSMENT — PAIN SCALES - GENERAL
PAINLEVEL_OUTOF10: 4
PAINLEVEL_OUTOF10: 6
PAINLEVEL_OUTOF10: 4
PAINLEVEL_OUTOF10: 6
PAINLEVEL_OUTOF10: 2

## 2021-03-13 ASSESSMENT — PAIN DESCRIPTION - PAIN TYPE: TYPE: SURGICAL PAIN

## 2021-03-13 NOTE — ANESTHESIA POSTPROCEDURE EVALUATION
Department of Anesthesiology  Postprocedure Note    Patient: Bimal Garcia  MRN: [id number]  YOB: 2000  Date of evaluation: 3/13/2021  Time:  12:07 PM     Procedure Summary     Date: 03/13/21 Room / Location: [address]    Anesthesia Start: 1056 Anesthesia Stop:     Procedure: APPENDECTOMY LAPAROSCOPIC (N/A Abdomen) Diagnosis: (appendacitis)    Surgeons: Ho Rosales MD Responsible Provider: Rodrick Hill DO    Anesthesia Type: general, regional ASA Status: 1 - Emergent          Anesthesia Type: GETA, TAP block    Jordon Phase I: Jordon Score: 10    Jordon Phase II:      Last vitals: Reviewed and per EMR flowsheets.        Anesthesia Post Evaluation    Patient location during evaluation: bedside  Patient participation: complete - patient participated  Level of consciousness: awake and awake and alert  Pain score: 0  Airway patency: patent  Nausea & Vomiting: no nausea and no vomiting  Complications: no  Cardiovascular status: blood pressure returned to baseline and hemodynamically stable  Respiratory status: acceptable  Hydration status: euvolemic

## 2021-03-13 NOTE — PROGRESS NOTES
Pt with oral airway in place. Airway held [facility] per RN. Dr Mary Mitchell at bedside and aware.

## 2021-03-13 NOTE — OP NOTE
Operative Note      PATIENT NAME: Shelley Patel  MEDICAL RECORD NO. [id number]  SURGEON: Hilda Bah MD FACS  Primary Care Physician: No primary care provider on file. PROCEDURE PERFORMED:  3/13/2021  PREOPERATIVE DIAGNOSIS: Acute appendicitis  POSTOPERATIVE DIAGNOSIS: Same, path pending  PROCEDURE PERFORMED:  Laparoscopic appendectomy  SURGEON:  Dr. Nathan Lloyd. ANESTHESIA:  General  ESTIMATED BLOOD LOSS: Minimal  SPECIMEN: Appendix sent to pathology for analysis. COMPLICATIONS:  None immediately appreciated. DISCUSSION: Elena Varela is a 21y.o. year old male who was seen in evaluation at request of No primary care provider on file. regarding signs and symptoms of appendicitis. The patient has radiographic findings of acute appendicitis. After history and physical examination was performed potential diagnostic and therapeutic modalities discussed with the patient. Operative and non operative management was discussed. Laparoscopic and open approaches reviewed. He was given opportunity to ask questions. Once answered informed consent was obtained. He was brought to operating room on 3/13/2021 for procedure. OPERATIVE FINDINGS:  At time of laparoscopy the appendix was indurated and exudative. No free fluid. No abscess . The remainder of abdominal viscera were unremarkable. The appendix was removed as described below. PROCEDURE:  The patient was brought to the operating room and placed in supine position. Placed under continuous cardiac telemetry, blood pressure, pulse oximetry monitoring and placed under general anesthesia by anesthesia department. The anterior abdominal wall was prepped and draped in sterile fashion. A time out was called and the patient and the procedure were properly identified. 1 cm periumbilical incision made with #11 scalpel blade. A Veres needle was inserted using the drop saline technique. Insufflation was done with 3 liters of CO2.  A 5 mm port was inserted into the abdomen. A 5 mm scope was placed through the port. Visual inspection of the abdomen carried out. Please see operative findings above for specifics. Under direct visualization a 12 mm port was placed in the epigastric area and a 5 mm port was placed in the left lower abdominal wall with no injury to underlying viscera. The appendix then grasped, retracted downwards. Neeru appendiceal adhesions lysed using blunt dissection. Blunt and electrocautery dissection was done through the mesentery of the appendix to its base at the cecum. An Endo RAMONE stapler was fired across the base of the appendix at the cecum. The appendiceal stump and the mesentery appeared to have good hemostasis. The appendix was then placed in a specimen bag and removed out the epigastric 12 mm port site. The appendiceal fossa was reinspected. Adequate hemostasis appreciated. Surrounding structures appeared unharmed. The right lower quadrant was irrigated with saline solution. Excess irrigation and fluid was removed via suction. All ports and instruments were removed from the patient's abdomen. Pneumoperitoneum  was reduced. The fascial defect of the 12 mm port was approximated using 0 Vicryl suture. The 2 remaining wounds were closed using 3-0 Vicryl suture for the subcutaneous tissue and 4-0  Monocryl in running fashion for the skin. The wound was then covered with a skin adhesive. Sterile dressings were applied. The patient was brought out of anesthesia, transferred to PACU in stable and satisfactory condition. No immediate complications evident. Postoperative findings were discussed with the patient's father by phone.        Electronically signed by Kyung Nolasco MD on 3/13/21 at 11:48 AM EST

## 2021-03-13 NOTE — ANESTHESIA PROCEDURE NOTES
Peripheral Block    Patient location during procedure: OR  Start time: 3/13/2021 11:02 AM  End time: 3/13/2021 11:10 AM  Staffing  Performed: anesthesiologist   Anesthesiologist: Ibis Peralta DO  Preanesthetic Checklist  Completed: patient identified, IV checked, site marked, risks and benefits discussed, surgical consent, monitors and equipment checked, pre-op evaluation, timeout performed, anesthesia consent given, oxygen available and patient being monitored  Peripheral Block  Patient position: supine  Prep: ChloraPrep  Patient monitoring: cardiac monitor, continuous pulse ox, frequent blood pressure checks and IV access  Block type: TAP  Laterality: bilateral  Injection technique: single-shot  Guidance: ultrasound guided  Local infiltration: ropivacaine  Infiltration strength: 0.5 %  Dose: 30 mL  Provider prep: mask and sterile gloves (Sterile probe cover)  Local infiltration: ropivacaine  Needle  Needle type: combined needle/nerve stimulator   Needle gauge: 22 G  Needle length: 10 cm  Needle localization: anatomical landmarks and ultrasound guidance  Assessment  Injection assessment: negative aspiration for heme, no paresthesia on injection and local visualized surrounding nerve on ultrasound  Paresthesia pain: immediately resolved  Slow fractionated injection: yes  Hemodynamics: stable  Additional Notes  Ultrasound image printed and saved in patient chart.     Sterile probe cover used    Ropivacaine 0.5% 30 ml + lidocaine 1% with epi 1:100 k 10 ml    20 ml bilateral  Medications Administered  Ropivacaine (NAROPIN) injection 0.5%, 30 mL  Reason for block: post-op pain management and at surgeon's request

## 2021-03-13 NOTE — H&P
has a past medical history of Asthma. Past Surgical History   has a past surgical history that includes [place] tooth extraction. Medications  Prior to Admission medications    Medication Sig Start Date End Date Taking? Authorizing Provider   clindamycin-tretinoin (Creston Dural) 1.2-0.025 % gel Apply topically 2 times daily   Yes Historical Provider, MD   Cholecalciferol (VITAMIN D3) 50 MCG ( [place]) CAPS Take by mouth daily   Yes Historical Provider, MD    Scheduled Meds:   piperacillin-tazobactam  3,375 mg Intravenous Q8H     Continuous Infusions:   dextrose 5 % and 0.45 % NaCl 100 mL/hr at 21 2237     PRN Meds:.morphine, ondansetron, acetaminophen  Allergies  is allergic to seasonal.  Family History  family history is not on file. Social History   reports that he has never smoked. He has never used smokeless tobacco. He reports previous alcohol use. He reports that he does not use drugs. Review of Systems:  General Denies any fever or chills  HEENT Denies any diplopia, tinnitus or vertigo  Resp Denies any shortness of breath, cough or wheezing  Cardiac Denies any chest pain, palpitations, claudication or edema  GI Denies any melena, hematochezia, hematemesis or pyrosis   Denies any frequency, urgency, hesitancy or incontinence  Heme Denies bruising or bleeding easily  Endocrine Denies any history of diabetes or thyroid disease  Neuro Denies any focal motor or sensory deficits  OBJECTIVE:   CURRENT VITALS:  oral temperature is 98.2 °F (36.8 °C). His blood pressure is 135/78 and his pulse is 98. His respiration is 18 and oxygen saturation is 98%.    Temperature Range (24h):Temp: 98.2 °F (36.8 °C) Temp  Av.4 °F (36.9 °C)  Min: 98.2 °F (36.8 °C)  Max: 98.5 °F (36.9 °C)  BP Range (99V): Systolic (40XHN), TOZ:060 , Min:135 , BXN:923     Diastolic (12KHH), KQL:84, Min:78, Max:109    Pulse Range (24h): Pulse  Av.5  Min: 98  Max: 123  Respiration Range (24h): Resp  Av  Min: 18  Max: 18  Current Pulse Ox (24h):  SpO2: 98 %  Pulse Ox Range (24h):  SpO2  Av.5 %  Min: 97 %  Max: 98 %  Oxygen Amount and Delivery:    CONSTITUTIONAL: Alert and oriented times 3, no acute distress and cooperative to examination with proper mood and affect. SKIN: Skin color, texture, turgor normal. No rashes or lesions. LYMPH: no cervical nodes, no inguinal nodes  HEENT: Head is normocephalic, atraumatic. EOMI, PERRLA. NECK: Supple, symmetrical, trachea midline, no adenopathy, thyroid symmetric, not enlarged and no tenderness, skin normal.  CHEST/LUNGS: chest symmetric with normal A/P diameter, normal respiratory rate and rhythm, lungs clear to auscultation without wheezes, rales or rhonchi. No accessory muscle use. Scars None   CARDIOVASCULAR: Heart sounds are normal.  Regular rate and rhythm without murmur. Carotid and femoral pulses 2+/4 and equal bilaterally. ABDOMEN: Normal shape. No scar(s) present. Normal bowel sounds. No bruits. soft, nondistended, no masses or organomegaly. no evidence of hernia. Percussion: Normal without hepatosplenomegally. Tenderness: RLQ. RECTAL: deferred, not clinically indicated  NEUROLOGIC: There are no focalizing motor or sensory deficits. CN II-XII are grossly intact. Jojo Salm EXTREMITIES: no cyanosis, no clubbing and no edema. LABS:     Recent Labs     21  1643 21  1921 21  0647   WBC 8.0  --  5.9   HGB 15.1  --  14.2   HCT 43.7  --  40.9*     --  212     --   --    K 3.9  --   --      --   --    CO2 28  --   --    BUN 11  --   --    CREATININE 0.99  --   --    CALCIUM 10.0*  --   --    AST 13  --   --    ALT 13  --   --    BILITOT 0.8*  --   --    NITRU  --  Negative  --    COLORU  --  Emily  --    BACTERIA  --  Negative  --      RADIOLOGY:   I have personally reviewed the following films:  CT scan abd/pelvis: Dilated appendix with some mild periappendiceal inflammation. Appears to be uncomplicated acute appendicitis.   Electronically signed by France Valladares Reginaldo Hayes MD on 3/13/2021 at 7:43 AM

## 2021-03-13 NOTE — PROGRESS NOTES
Admission complete. Pt is AxOx4. VS are stable. Pt rates his pain 4/5. In abdomen but says it is hard to pinpoint exactly where. Says it is worse with pressure. Bowel sounds present  Medicated with tylenol per his request. He preferred not to take morphine and Andrasko was okay with that. Denies nausea and vomiting, SOB. Skin intact. Up independently. Call light within reach. Will continue to monitor.      Electronically signed by Ama Gutierrez RN on 3/13/2021 at 1:02 AM

## 2021-03-14 VITALS
DIASTOLIC BLOOD PRESSURE: 69 MMHG | HEART RATE: 71 BPM | RESPIRATION RATE: 17 BRPM | OXYGEN SATURATION: 97 % | TEMPERATURE: 94.3 F | SYSTOLIC BLOOD PRESSURE: 129 MMHG

## 2021-03-14 PROCEDURE — 2700000000 HC OXYGEN THERAPY PER DAY

## 2021-03-14 PROCEDURE — 96376 TX/PRO/DX INJ SAME DRUG ADON: CPT

## 2021-03-14 PROCEDURE — 6370000000 HC RX 637 (ALT 250 FOR IP): Performed by: SURGERY

## 2021-03-14 PROCEDURE — 99024 POSTOP FOLLOW-UP VISIT: CPT | Performed by: SURGERY

## 2021-03-14 PROCEDURE — G0378 HOSPITAL OBSERVATION PER HR: HCPCS

## 2021-03-14 PROCEDURE — 96375 TX/PRO/DX INJ NEW DRUG ADDON: CPT

## 2021-03-14 PROCEDURE — 6360000002 HC RX W HCPCS: Performed by: SURGERY

## 2021-03-14 RX ORDER — TRAMADOL HYDROCHLORIDE 50 MG/1
50 TABLET ORAL EVERY 6 HOURS PRN
Qty: 15 TABLET | Refills: 0 | Status: SHIPPED | OUTPATIENT
Start: 2021-03-14 | End: 2021-03-19

## 2021-03-14 RX ADMIN — KETOROLAC TROMETHAMINE 15 MG: 15 INJECTION, SOLUTION INTRAMUSCULAR; INTRAVENOUS at 00:56

## 2021-03-14 RX ADMIN — TRAMADOL HYDROCHLORIDE 50 MG: 50 TABLET, FILM COATED ORAL at 06:35

## 2021-03-14 RX ADMIN — TRAMADOL HYDROCHLORIDE 50 MG: 50 TABLET, FILM COATED ORAL at 13:37

## 2021-03-14 RX ADMIN — KETOROLAC TROMETHAMINE 15 MG: 15 INJECTION, SOLUTION INTRAMUSCULAR; INTRAVENOUS at 09:40

## 2021-03-14 ASSESSMENT — PAIN SCALES - GENERAL: PAINLEVEL_OUTOF10: 6

## 2021-03-14 NOTE — DISCHARGE SUMMARY
Pt Name: Adalberto King  MRN: [id number]  YOB: 2000  Primary Care Physician: No primary care provider on file. Admit date:  3/12/2021  9:22 PM  Discharge date:  3/14/2021  Disposition: home  Admitting Diagnosis:   1. Acute appendicitis with localized peritonitis, without perforation, abscess, or gangrene      Discharge Diagnosis:   Patient Active Problem List   Diagnosis Code    Right lower quadrant abdominal pain R10.31    Acute appendicitis with localized peritonitis, without perforation, abscess, or gangrene K35.30     Consultants:  none  Procedures/Diagnostic Test:  [facility] Course: Lydia Greenfield originally presented to the hospital on 3/12/2021  9:22 PM in [place] with right lower quadrant abdominal pain and CAT scan evidence of appendicitis. He was transferred to [facility] [place] and underwent a laparoscopic appendectomy. Postoperative course was unremarkable and was discharged the following day  At time of discharge, Lydia Greenfield was tolerating a regular diet, having bowel movements,ambulating on his own accord and had adequate analgesia on oral pain medications, and had no signs of symptoms of complications. PHYSICAL EXAMINATION   Discharge Vitals:  oral temperature is 94.3 °F (34.6 °C). His blood pressure is 129/69 and his pulse is 71. His respiration is 17 and oxygen saturation is 97%.    General appearance - alert, well appearing, and in no distress  Chest - clear to ausculation  Heart - normal rate and regular rhythm  Abdomen - soft, incisional tenderness only, bowel sounds present  Neurological - motor and sensory grossly normal bilaterally  Musculoskeletal - full range of motion without pain  Extremities - peripheral pulses normal, no pedal edema, no clubbing or cyanosis  Incisions: healing well, no drainage  LABS     Recent Labs     03/13/21  0647 03/12/21  1643   WBC 5.9 8.0   HGB 14.2 15.1   HCT 40.9* 43.7    258   NA  --  139   K  --  3.9   CL  --  100   CO2  --  28   BUN  --  11   CREATININE --  0.99     DISCHARGE INSTRUCTIONS   Discharge Medications:      Medication List      START taking these medications    traMADol 50 MG tablet  Commonly known as: ULTRAM  Take 1 tablet by mouth every 6 hours as needed for Pain for up to 5 days. CONTINUE taking these medications    clindamycin-tretinoin 1.2-0.025 % gel  Commonly known as: ZIANA     Vitamin D3 50 MCG (2000 UT) Caps           Where to Get Your Medications      These medications were sent to [address], [address] [phone]  [address] [facility] [address]    Phone: [phone]   · traMADol 50 MG tablet       Diet: diet as tolerated  Activity: no lifting more than 10-20 lbs for next two weeks  Wound Care: no dressings needed OK to shower  Follow-up: Follow up with Addis Fletcher in 2 weeks.   Time Spent for discharge: 20 minutes

## 2021-03-14 NOTE — PLAN OF CARE
Problem: Pain:  Description: Pain management should include both nonpharmacologic and pharmacologic interventions.   Goal: Pain level will decrease  Outcome: Ongoing  Goal: Control of acute pain  Outcome: Ongoing  Goal: Control of chronic pain  Outcome: Ongoing

## 2021-03-14 NOTE — PROGRESS NOTES
Pt resting in bed no distress. Medicated with tylenol or tramadol for pain control. ABD soft tender Lap sites derma bonded and CDI. Up and ambulating in room. Vitals are stable. Call light in reach.

## 2021-03-14 NOTE — PROGRESS NOTES
Pt Name: James Hassan Record Number: [id number]  Date of Birth 2000   Admit date 3/12/2021  9:22 PM  Today's Date: 3/14/2021     ASSESSMENT  1. Post op day # 1  2. Natty Rao had Procedure(s):  APPENDECTOMY LAPAROSCOPIC   3. Doing well    PLAN  1. Home today      SUBJECTIVE  Chief complaint: abdominal pain   Afebrile, vital signs are stable. He denies any nausea or vomiting, has not passed flatus or had a bowel movement. He is tolerating a DIET GENERAL;. His pain is well controlled on current medications. He has been ambulating in the halls. has a past medical history of Asthma. CURRENT MEDS  Scheduled Meds:   sodium chloride flush  10 mL Intravenous 2 times per day     Continuous Infusions:   lactated ringers 50 mL/hr at 21     PRN Meds:.traMADol, sodium chloride flush, ketorolac, promethazine **OR** ondansetron, acetaminophen    OBJECTIVE  CURRENT VITALS:  oral temperature is 94.3 °F (34.6 °C). His blood pressure is 129/69 and his pulse is 71. His respiration is 17 and oxygen saturation is 97%.    Temperature Range (24h):Temp: 94.3 °F (34.6 °C) Temp  Av.4 °F (36.3 °C)  Min: 94.3 °F (34.6 °C)  Max: 98.5 °F (36.9 °C)  BP Range (00N): Systolic (02TCR), UMO:257 , Min:107 , RPV:649     Diastolic (27KNB), DOL:87, Min:52, Max:94    Pulse Range (24h): Pulse  Av.7  Min: 71  Max: 96  Respiration Range (24h): Resp  Av.4  Min: 0  Max: 38    GENERAL: alert, no distress  LUNGS: clear to ausculation, without wheezes, rales or rhonci  HEART: normal rate and regular rhythm  ABDOMEN: non-distended, soft, incisional tenderness, bowel sounds present in all 4 quadrants and no guarding or peritoneal signs  INCISIONS: healing well, no significant drainage, no significant erythema  EXTERMITY: no cyanosis, clubbing or edema    In: 1566 [P.O.:640; I.V.:926]  Out: 2300 [Urine:2300]  Date 21 0000 - 21 2359   Shift 2090-7126 4884-8812 9148-9912 24 Hour Total   INTAKE   P.O. 400   400 I.V. 626   626   Shift Total 1026   1026   OUTPUT   Urine 800   800   Shift Total 800   800   Weight (kg)           LABS  Recent Labs     03/12/21  1643 03/13/21  0647   WBC 8.0 5.9   HGB 15.1 14.2   HCT 43.7 40.9*    212     --    K 3.9  --      --    CO2 28  --    BUN 11  --    CREATININE 0.99  --    CALCIUM 10.0*  --       No results for input(s): PTT, INR in the last 72 hours. Invalid input(s): PT  Recent Labs     03/12/21  1643   AST 13   ALT 13   BILITOT 0.8*       RADIOLOGY  Ct Urogram    Result Date: 3/12/2021  COMPARISON: No prior studies available for comparison. HISTORY: Microscopic hematuria TECHNIQUE: CT urogram: Helically acquired images of the abdomen and pelvis were performed from the lung bases through the ischial tuberosities initially without contrast. Images were then obtained from the base of the lungs through the pelvis following the intravenous infusion of 100 mL of Isovue-370 given intravenously. Subsequent images were obtained after 6 minute delay. Coronal and sagittal reconstructed images were performed. CT UROGRAM FINDINGS: The visualized bases of the lungs show no acute infiltrate, pleural effusion or consolidation. Following the administration of contrast there was prompt uptake of contrast by both kidneys. No renal calculi are visualized. The ureters are seen down to the insertion into the bladder. Contrast is seen layering in the bladder. The bladder wall may be slightly thickened A lymph node is seen in the retroperitoneum at the level of the celiac axis on the left and measures 8 mm. This may be reactive. The gallbladder, pancreas, adrenal glands and liver are unremarkable. The appendix is dilated up to 9 mm. The terminal ileum is thickened versus incomplete distention. Free fluid is seen in the pelvis that measures 4.2 x 3.2 cm. There is subtle stranding in the mesenteric fat.  In the right lower quadrant there are a few scattered lymph nodes and also in the mid abdomen. The largest measures 8.5 mm. A small umbilical hernia is seen that contains fat. The bony structures are grossly intact. . 1. No hydronephrosis or renal calculus. 2. Bladder wall appears mildly thickened and could reflects cystitis. 3. Bowel wall thickening versus incomplete distention is seen in the region of the distal ileum. Also there is free fluid seen in the pelvis. The appendix is dilated. These findings could relate to inflammatory bowel disease or a  chronic appendicitis. Recommend correlation with lab values and symptoms. All CT scans at this facility use dose modulation, iterative reconstruction, and/or weight based dosing when appropriate to reduce radiation dose to as low as reasonably achievable.      Electronically signed by Patricia Black MD on 3/14/2021 at 11:52 AM

## 2021-03-26 ENCOUNTER — OFFICE VISIT (OUTPATIENT)
Dept: SURGERY | Age: 21
End: 2021-03-26

## 2021-03-26 VITALS
HEIGHT: 73 IN | SYSTOLIC BLOOD PRESSURE: 124 MMHG | WEIGHT: 176 LBS | DIASTOLIC BLOOD PRESSURE: 78 MMHG | TEMPERATURE: 97.3 F | BODY MASS INDEX: 23.33 KG/M2

## 2021-03-26 DIAGNOSIS — K35.30 ACUTE APPENDICITIS WITH LOCALIZED PERITONITIS, WITHOUT PERFORATION, ABSCESS, OR GANGRENE: Primary | ICD-10-CM

## 2021-03-26 PROCEDURE — 99024 POSTOP FOLLOW-UP VISIT: CPT | Performed by: SURGERY

## 2021-03-26 NOTE — PROGRESS NOTES
Eboni Tijerina (:  2000) is a 21 y.o. male,Established patient, here for evaluation of the following chief complaint(s):  Post-Op Check (post op LAP appy)      ASSESSMENT/PLAN:  Doing well status post appendectomy    Return to see me on a as needed basis  Activities as tolerated    SUBJECTIVE/OBJECTIVE:  HPI   Eboni Tijerina is status laparoscopic appendectomy on 3/11/2021. The appendix was not perforated. There was not an abscess. The hospital course was routine with no significant post op problems. The path report showed acute appendicitis. The patient denies fevers, chills, sweats or wound problems. He is home and is convalescing very well. Appetite good. His GI function is back to normal.    Review of Systems    Physical Exam  Constitutional:       General: He is not in acute distress. Appearance: Normal appearance. HENT:      Mouth/Throat:      Mouth: Mucous membranes are moist.      Pharynx: Oropharynx is clear. Eyes:      Pupils: Pupils are equal, round, and reactive to light. Neck:      Comments: Neck is supple without any masses, no thyromegaly, trachea midline  Abdominal:      Comments: Incisions are well approximated, erythema is not present, swelling is mild, no evidence of hernia, tenderness is mild, drainage is not present, skin glue/sutures are present   Musculoskeletal:      Comments: Normal gait   Skin:     Findings: No bruising, lesion or rash. Neurological:      Mental Status: He is alert and oriented to person, place, and time. Psychiatric:         Mood and Affect: Mood normal.         Judgment: Judgment normal.       /78   Temp 97.3 °F (36.3 °C) (Temporal)   Ht 6' 1\" (1.854 m)   Wt 176 lb (79.8 kg)   BMI 23.22 kg/m²           An electronic signature was used to authenticate this note.     --Kynug Nolasco MD

## 2021-03-30 ENCOUNTER — PROCEDURE VISIT (OUTPATIENT)
Dept: UROLOGY | Age: 21
End: 2021-03-30
Payer: COMMERCIAL

## 2021-03-30 VITALS
SYSTOLIC BLOOD PRESSURE: 132 MMHG | HEIGHT: 73 IN | HEART RATE: 99 BPM | BODY MASS INDEX: 22.53 KG/M2 | DIASTOLIC BLOOD PRESSURE: 72 MMHG | WEIGHT: 170 LBS

## 2021-03-30 DIAGNOSIS — R31.29 MICROSCOPIC HEMATURIA: Primary | ICD-10-CM

## 2021-03-30 PROCEDURE — 52000 CYSTOURETHROSCOPY: CPT | Performed by: UROLOGY

## 2021-03-30 PROCEDURE — 81003 URINALYSIS AUTO W/O SCOPE: CPT | Performed by: UROLOGY

## 2021-03-30 RX ORDER — PHENAZOPYRIDINE HYDROCHLORIDE 200 MG/1
200 TABLET, FILM COATED ORAL 3 TIMES DAILY PRN
Qty: 10 TABLET | Refills: 0 | Status: SHIPPED | OUTPATIENT
Start: 2021-03-30 | End: 2021-04-02

## 2021-03-30 RX ORDER — SULFAMETHOXAZOLE AND TRIMETHOPRIM 800; 160 MG/1; MG/1
1 TABLET ORAL ONCE
Qty: 1 TABLET | Refills: 0 | COMMUNITY
Start: 2021-03-30 | End: 2021-03-30

## 2021-03-30 NOTE — PROGRESS NOTES
Subjective:      Patient ID: Nic Arteaga is a 21 y.o. male. HPI  This is a 20 yo male with prior h/o Asthma and with a 1.5 yr h/o of microhematuria and no specific  symptoms back in follow-up. Since last seen he had a appendectomy by Dr Joseline Napier on 3/12/20. He has no new  complaints and is to see Nephrology soon. He wants to proceed with cystoscopy today. Past Medical History:   Diagnosis Date    Asthma      Past Surgical History:   Procedure Laterality Date    LAPAROSCOPIC APPENDECTOMY N/A 3/13/2021    APPENDECTOMY LAPAROSCOPIC performed by Martínez Winter MD at [facility] [address] History     Socioeconomic History    Marital status: Single     Spouse name: None    Number of children: None    Years of education: None    Highest education level: None   Occupational History    None   Social Needs    Financial resource strain: None    Food insecurity     Worry: None     Inability: None    Transportation needs     Medical: None     Non-medical: None   Tobacco Use    Smoking status: Never Smoker    Smokeless tobacco: Never Used   Substance and Sexual Activity    Alcohol use: Not Currently    Drug use: Never    Sexual activity: Not Currently   Lifestyle    Physical activity     Days per week: None     Minutes per session: None    Stress: None   Relationships    Social connections     Talks on phone: None     Gets together: None     Attends Baptism service: None     Active member of club or organization: None     Attends meetings of clubs or organizations: None     Relationship status: None    Intimate partner violence     Fear of current or ex partner: None     Emotionally abused: None     Physically abused: None     Forced sexual activity: None   Other Topics Concern    None   Social History Narrative    None     History reviewed. No pertinent family history.   Current Outpatient Medications   Medication Sig Dispense Refill    clindamycin-tretinoin (ZIANA) 1.2-0.025 % gel Apply topically daily       Cholecalciferol (VITAMIN D3) 50 MCG (2000 UT) CAPS Take by mouth daily       No current facility-administered medications for this visit. Seasonal  reviewed    Review of Systems   Genitourinary: Negative for dysuria and flank pain.        Objective:   Physical Exam  Genitourinary:     Penis: Normal.          3/30/2021  2:00 PM - Deneen Aquino CMA (AAMA)    Component Collected Lab   Color, UA 03/30/2021  1:59 PM Unknown   yellow    Clarity, UA 03/30/2021  1:59 PM Unknown   clear    Glucose, UA POC 03/30/2021  1:59 PM Unknown   neg    Bilirubin, UA 03/30/2021  1:59 PM Unknown   neg    Ketones, UA 03/30/2021  1:59 PM Unknown   neg    Spec Grav, UA 03/30/2021  1:59 PM Unknown   1.030    Blood, UA POC 03/30/2021  1:59 PM Unknown   large    pH, UA 03/30/2021  1:59 PM Unknown   5.5    Protein, UA POC 03/30/2021  1:59 PM Unknown   small    Urobilinogen, UA 03/30/2021  1:59 PM Unknown   0.2    Leukocytes, UA 03/30/2021  1:59 PM Unknown   neg    Nitrite, UA 03/30/2021  1:59 PM Unknown   neg        CT Urogram  Status: Final result   Order Providers    Authorizing Encounter Billing   Demetri Contreras MD [facility] ROOM 1 Shaye Krishnamurthy DO          Signed by    Signed Date/Time Phone Pager   Maria Teresa Kent 3/12/2021  3:38 [phone]    Reading Providers    Read Date Phone Pager   Maria Teresa [place] Mar 12, 2021  3:38 [phone]    All Reviewers List    Demetri Contreras MD on 3/15/2021 05:27   Routing History    Priority Sent On From To Message Type    3/12/2021  3:41 PM Joseluis, Chpo Incoming Radiant Results From Moreno Howard MD Results   Radiation Dose Estimates    No radiation information found for this patient   Narrative   COMPARISON: No prior studies available for comparison.       HISTORY: Microscopic hematuria       TECHNIQUE: CT urogram: Helically acquired images of the abdomen and pelvis were performed from the lung bases through the ischial tuberosities initially without contrast. Images were then obtained from the base of the lungs through the pelvis following    the intravenous infusion of 100 mL of Isovue-370 given intravenously. Subsequent images were obtained after 6 minute delay. Coronal and sagittal reconstructed images were performed.           CT UROGRAM       FINDINGS:       The visualized bases of the lungs show no acute infiltrate, pleural effusion or consolidation.        Following the administration of contrast there was prompt uptake of contrast by both kidneys. No renal calculi are visualized. The ureters are seen down to the insertion into the bladder. Contrast is seen layering in the bladder. The bladder wall may be    slightly thickened       A lymph node is seen in the retroperitoneum at the level of the celiac axis on the left and measures 8 mm. This may be reactive.       The gallbladder, pancreas, adrenal glands and liver are unremarkable.        The appendix is dilated up to 9 mm. The terminal ileum is thickened versus incomplete distention. Free fluid is seen in the pelvis that measures 4.2 x 3.2 cm. There is subtle stranding in the mesenteric fat.       In the right lower quadrant there are a few scattered lymph nodes and also in the mid abdomen. The largest measures 8.5 mm. A small umbilical hernia is seen that contains fat. The bony structures are grossly intact.                   Impression   .           1. No hydronephrosis or renal calculus. 2. Bladder wall appears mildly thickened and could reflects cystitis. 3. Bowel wall thickening versus incomplete distention is seen in the region of the distal ileum. Also there is free fluid seen in the pelvis. The appendix is dilated. These findings could relate to inflammatory bowel disease or a  chronic appendicitis.     Recommend correlation with lab values and symptoms.       All CT scans at this facility use dose modulation, iterative reconstruction, and/or weight based dosing when appropriate to reduce radiation dose to as low as reasonably achievable.           3/30/2021  2:00 PM - Vladimir Wiggins CMA (AAMA)    Component Collected Lab   Color, UA 03/30/2021  1:59 PM Unknown   yellow    Clarity, UA 03/30/2021  1:59 PM Unknown   clear    Glucose, UA POC 03/30/2021  1:59 PM Unknown   neg    Bilirubin, UA 03/30/2021  1:59 PM Unknown   neg    Ketones, UA 03/30/2021  1:59 PM Unknown   neg    Spec Grav, UA 03/30/2021  1:59 PM Unknown   1.030    Blood, UA POC 03/30/2021  1:59 PM Unknown   large    pH, UA 03/30/2021  1:59 PM Unknown   5.5    Protein, UA POC 03/30/2021  1:59 PM Unknown   small    Urobilinogen, UA 03/30/2021  1:59 PM Unknown   0.2    Leukocytes, UA 03/30/2021  1:59 PM Unknown   neg    Nitrite, UA 03/30/2021  1:59 PM Unknown   neg    Lab and Collection      Cystoscopy Procedure Note    Pre-operative Diagnosis: Microhematuria    Post-operative Diagnosis: Same plus benign findings    Surgeon: Merle Mccallum     Assistants: Finley Dakins. LICHA Patrick    Anesthesia: Local anesthesia topical 2% lidocaine gel    Procedure Details   The risks, benefits, complications, treatment options, and expected outcomes were discussed with the patient. The patient concurred with the proposed plan, giving informed consent. Cystoscopy was performed today under local anesthesia, using sterile technique. The patient was placed in the supine position, prepped and draped in the usual sterile fashion. A flexible cystoscope was used to inspect the entire bladder including retroflexion. Findings:  Anterior urethra: normal  Prostatic Urethra:normal  Bladder: Normal mucosa without bladder tumors, stones, clots or FB  Ureteral orifice(s) were normal . Ureteral orifice(s) were in the normal location. Specimens: None                 Complications:  None; patient tolerated the procedure well. Disposition: To home. Condition: stable    Assessment:       This is a 20 yo male with prior h/o Asthma and with a 1.5 yr h/o of microhematuria and no specific  symptoms. He has a negative hematuria evaluation and is to see Nephrology soon. Will provide pyridium for post cystoscopy dysuria. Plan:      1. F/U prn  2.    Orders Placed This Encounter   Medications    sulfamethoxazole-trimethoprim (BACTRIM DS) 800-160 MG per tablet     Sig: Take 1 tablet by mouth once for 1 dose     Dispense:  1 tablet     Refill:  0    phenazopyridine (PYRIDIUM) 200 MG tablet     Sig: Take 1 tablet by mouth 3 times daily as needed for Pain     Dispense:  10 tablet     Refill:  0             Jessika Kerns MD

## 2021-04-02 ENCOUNTER — HOSPITAL ENCOUNTER (OUTPATIENT)
Dept: LAB | Age: 21
Discharge: HOME | End: 2021-04-02
Payer: COMMERCIAL

## 2021-04-02 LAB
ALBUMIN SERPL-MCNC: 4.6 G/DL (ref 3.5–4.6)
ANION GAP SERPL CALCULATED.3IONS-SCNC: 8 MEQ/L (ref 9–15)
BACTERIA: NEGATIVE /HPF
BILIRUBIN URINE: NEGATIVE
BLOOD, URINE: ABNORMAL
BUN BLDV-MCNC: 10 MG/DL (ref 6–20)
CALCIUM SERPL-MCNC: 9.6 MG/DL (ref 8.5–9.9)
CHLORIDE BLD-SCNC: 103 MEQ/L (ref 95–107)
CLARITY: CLEAR
CO2: 30 MEQ/L (ref 20–31)
COLOR: YELLOW
CREAT SERPL-MCNC: 0.91 MG/DL (ref 0.7–1.2)
CREATININE URINE: 184.7 MG/DL
EPITHELIAL CELLS, UA: ABNORMAL /HPF (ref 0–5)
GFR AFRICAN AMERICAN: >60
GFR NON-AFRICAN AMERICAN: >60
GLUCOSE BLD-MCNC: 82 MG/DL (ref 70–99)
GLUCOSE URINE: NEGATIVE MG/DL
HCT VFR BLD CALC: 41.6 % (ref 42–52)
HEMOGLOBIN: 14.4 G/DL (ref 14–18)
HYALINE CASTS: ABNORMAL /HPF (ref 0–5)
KETONES, URINE: NEGATIVE MG/DL
LEUKOCYTE ESTERASE, URINE: NEGATIVE
MCH RBC QN AUTO: 28.5 PG (ref 27–31.3)
MCHC RBC AUTO-ENTMCNC: 34.6 % (ref 33–37)
MCV RBC AUTO: 82.4 FL (ref 80–100)
NITRITE, URINE: NEGATIVE
PDW BLD-RTO: 12.5 % (ref 11.5–14.5)
PH UA: 6 (ref 5–9)
PHOSPHORUS: 3 MG/DL (ref 2.3–4.8)
PLATELET # BLD: 263 K/UL (ref 130–400)
POTASSIUM SERPL-SCNC: 4.5 MEQ/L (ref 3.4–4.9)
PROTEIN PROTEIN: 26 MG/DL
PROTEIN UA: ABNORMAL MG/DL
PROTEIN/CREAT RATIO: 0.1 ML/ML
PROTEIN/CREAT RATIO: 0.1 ML/ML (ref 0–0.2)
RBC # BLD: 5.05 M/UL (ref 4.7–6.1)
RBC UA: ABNORMAL /HPF (ref 0–5)
SODIUM BLD-SCNC: 141 MEQ/L (ref 135–144)
SPECIFIC GRAVITY UA: 1.02 (ref 1–1.03)
UROBILINOGEN, URINE: 0.2 E.U./DL
WBC # BLD: 4.7 K/UL (ref 4.5–11)
WBC UA: ABNORMAL /HPF (ref 0–5)

## 2021-04-02 PROCEDURE — 84156 ASSAY OF PROTEIN URINE: CPT

## 2021-04-02 PROCEDURE — 80069 RENAL FUNCTION PANEL: CPT

## 2021-04-02 PROCEDURE — 85027 COMPLETE CBC AUTOMATED: CPT

## 2021-04-02 PROCEDURE — 36415 COLL VENOUS BLD VENIPUNCTURE: CPT

## 2021-04-02 PROCEDURE — 87086 URINE CULTURE/COLONY COUNT: CPT

## 2021-04-02 PROCEDURE — 81001 URINALYSIS AUTO W/SCOPE: CPT

## 2021-04-04 LAB — URINE CULTURE, ROUTINE: NORMAL

## 2021-04-22 ENCOUNTER — HOSPITAL ENCOUNTER (OUTPATIENT)
Dept: LAB | Age: 21
Discharge: HOME | End: 2021-04-22
Payer: COMMERCIAL

## 2021-04-22 LAB
ALBUMIN SERPL-MCNC: 4.6 G/DL (ref 3.5–4.6)
ANION GAP SERPL CALCULATED.3IONS-SCNC: 9 MEQ/L (ref 9–15)
APTT: 33.7 SEC (ref 24.4–36.8)
BACTERIA: NEGATIVE /HPF
BILIRUBIN URINE: NEGATIVE
BLOOD, URINE: ABNORMAL
BUN BLDV-MCNC: 10 MG/DL (ref 6–20)
CALCIUM SERPL-MCNC: 9.7 MG/DL (ref 8.5–9.9)
CHLORIDE BLD-SCNC: 104 MEQ/L (ref 95–107)
CLARITY: CLEAR
CO2: 27 MEQ/L (ref 20–31)
COLOR: YELLOW
CREAT SERPL-MCNC: 0.81 MG/DL (ref 0.7–1.2)
CREATININE URINE: 142.9 MG/DL
EPITHELIAL CELLS, UA: ABNORMAL /HPF (ref 0–5)
GFR AFRICAN AMERICAN: >60
GFR NON-AFRICAN AMERICAN: >60
GLUCOSE BLD-MCNC: 87 MG/DL (ref 70–99)
GLUCOSE URINE: NEGATIVE MG/DL
HAV IGM SER IA-ACNC: NORMAL
HCT VFR BLD CALC: 43.7 % (ref 42–52)
HEMOGLOBIN: 15.2 G/DL (ref 14–18)
HEPATITIS B CORE IGM ANTIBODY: NORMAL
HEPATITIS B SURFACE ANTIGEN INTERPRETATION: NORMAL
HEPATITIS C ANTIBODY INTERPRETATION: NORMAL
HEPATITIS INTERPRETATION:: NORMAL
HYALINE CASTS: ABNORMAL /HPF (ref 0–5)
INR BLD: 1
KETONES, URINE: NEGATIVE MG/DL
LEUKOCYTE ESTERASE, URINE: NEGATIVE
MCH RBC QN AUTO: 28.7 PG (ref 27–31.3)
MCHC RBC AUTO-ENTMCNC: 34.8 % (ref 33–37)
MCV RBC AUTO: 82.4 FL (ref 80–100)
NITRITE, URINE: NEGATIVE
PDW BLD-RTO: 12.2 % (ref 11.5–14.5)
PH UA: 5.5 (ref 5–9)
PHOSPHORUS: 3 MG/DL (ref 2.3–4.8)
PLATELET # BLD: 241 K/UL (ref 130–400)
POTASSIUM SERPL-SCNC: 4.2 MEQ/L (ref 3.4–4.9)
PROTEIN PROTEIN: 17 MG/DL
PROTEIN UA: NEGATIVE MG/DL
PROTEIN/CREAT RATIO: 0.1 ML/ML
PROTEIN/CREAT RATIO: 0.1 ML/ML (ref 0–0.2)
PROTHROMBIN TIME: 13.5 SEC (ref 12.3–14.9)
RBC # BLD: 5.3 M/UL (ref 4.7–6.1)
RBC UA: ABNORMAL /HPF (ref 0–5)
SODIUM BLD-SCNC: 140 MEQ/L (ref 135–144)
SPECIFIC GRAVITY UA: 1.01 (ref 1–1.03)
UROBILINOGEN, URINE: 0.2 E.U./DL
WBC # BLD: 4.6 K/UL (ref 4.5–11)
WBC UA: ABNORMAL /HPF (ref 0–5)

## 2021-04-22 PROCEDURE — 86235 NUCLEAR ANTIGEN ANTIBODY: CPT

## 2021-04-22 PROCEDURE — 85610 PROTHROMBIN TIME: CPT

## 2021-04-22 PROCEDURE — 86225 DNA ANTIBODY NATIVE: CPT

## 2021-04-22 PROCEDURE — 84155 ASSAY OF PROTEIN SERUM: CPT

## 2021-04-22 PROCEDURE — 86039 ANTINUCLEAR ANTIBODIES (ANA): CPT

## 2021-04-22 PROCEDURE — 84156 ASSAY OF PROTEIN URINE: CPT

## 2021-04-22 PROCEDURE — 81001 URINALYSIS AUTO W/SCOPE: CPT

## 2021-04-22 PROCEDURE — 85027 COMPLETE CBC AUTOMATED: CPT

## 2021-04-22 PROCEDURE — 80069 RENAL FUNCTION PANEL: CPT

## 2021-04-22 PROCEDURE — 80074 ACUTE HEPATITIS PANEL: CPT

## 2021-04-22 PROCEDURE — 85730 THROMBOPLASTIN TIME PARTIAL: CPT

## 2021-04-22 PROCEDURE — 84165 PROTEIN E-PHORESIS SERUM: CPT

## 2021-04-22 PROCEDURE — 86160 COMPLEMENT ANTIGEN: CPT

## 2021-04-22 PROCEDURE — 86255 FLUORESCENT ANTIBODY SCREEN: CPT

## 2021-04-22 PROCEDURE — 36415 COLL VENOUS BLD VENIPUNCTURE: CPT

## 2021-04-22 PROCEDURE — 86038 ANTINUCLEAR ANTIBODIES: CPT

## 2021-04-24 LAB
COMPLEMENT C3: 112 MG/DL (ref 90–180)
COMPLEMENT C4: 28 MG/DL (ref 10–40)

## 2021-04-25 LAB
ANA IGG, ELISA: DETECTED
ANCA IFA: NORMAL
REASON FOR REJECTION: NORMAL
REJECTED TEST: NORMAL

## 2021-04-26 LAB
ANA PATTERN: ABNORMAL
ANA TITER: ABNORMAL
ANTINUCLEAR AB INTERPRETIVE COMMENT: ABNORMAL
ANTINUCLEAR ANTIBODY, HEP-2, IGG: DETECTED

## 2021-04-27 LAB
ANTI JO-1 IGG: 2 AU/ML (ref 0–40)
DOUBLE STRANDED DNA AB, IGG: NORMAL
ENA TO SMITH (SM) ANTIBODY: 21 AU/ML (ref 0–40)
ENA TO SSB (LA) ANTIBODY: 3 AU/ML (ref 0–40)
SCLERODERMA (SCL-70) AB: 13 AU/ML (ref 0–40)
SSA 52 (RO) (ENA) AB, IGG: 4 AU/ML (ref 0–40)
SSA 60 (RO) (ENA) AB, IGG: 1 AU/ML (ref 0–40)

## 2021-04-28 LAB
ALBUMIN SERPL-MCNC: 4.41 G/DL (ref 3.75–5.01)
ALPHA-1-GLOBULIN: 0.25 G/DL (ref 0.19–0.46)
ALPHA-2-GLOBULIN: 0.61 G/DL (ref 0.48–1.05)
BETA GLOBULIN: 0.92 G/DL (ref 0.48–1.1)
ENA TO RNP ANTIBODY: NORMAL
GAMMA GLOBULIN: 1 G/DL (ref 0.62–1.51)
PROTEIN ELECTROPHORESIS, SERUM: NORMAL
SPE/IFE INTERPRETATION: NORMAL
TOTAL PROTEIN: 7.2 G/DL (ref 6.3–8.2)

## 2022-03-09 ENCOUNTER — TELEMEDICINE (OUTPATIENT)
Dept: PRIMARY CARE CLINIC | Age: 22
End: 2022-03-09
Payer: COMMERCIAL

## 2022-03-09 DIAGNOSIS — R19.7 DIARRHEA, UNSPECIFIED TYPE: Primary | ICD-10-CM

## 2022-03-09 PROCEDURE — 99442 PR PHYS/QHP TELEPHONE EVALUATION 11-20 MIN: CPT | Performed by: NURSE PRACTITIONER

## 2022-03-09 RX ORDER — PAROXETINE HYDROCHLORIDE 25 MG/1
1 TABLET, FILM COATED, EXTENDED RELEASE ORAL DAILY
COMMUNITY
Start: 2022-02-19

## 2022-03-09 SDOH — ECONOMIC STABILITY: TRANSPORTATION INSECURITY
IN THE PAST 12 MONTHS, HAS THE LACK OF TRANSPORTATION KEPT YOU FROM MEDICAL APPOINTMENTS OR FROM GETTING MEDICATIONS?: NO

## 2022-03-09 SDOH — ECONOMIC STABILITY: TRANSPORTATION INSECURITY
IN THE PAST 12 MONTHS, HAS LACK OF TRANSPORTATION KEPT YOU FROM MEETINGS, WORK, OR FROM GETTING THINGS NEEDED FOR DAILY LIVING?: NO

## 2022-03-09 SDOH — ECONOMIC STABILITY: FOOD INSECURITY: WITHIN THE PAST 12 MONTHS, YOU WORRIED THAT YOUR FOOD WOULD RUN OUT BEFORE YOU GOT MONEY TO BUY MORE.: NEVER TRUE

## 2022-03-09 SDOH — ECONOMIC STABILITY: FOOD INSECURITY: WITHIN THE PAST 12 MONTHS, THE FOOD YOU BOUGHT JUST DIDN'T LAST AND YOU DIDN'T HAVE MONEY TO GET MORE.: NEVER TRUE

## 2022-03-09 ASSESSMENT — PATIENT HEALTH QUESTIONNAIRE - PHQ9
SUM OF ALL RESPONSES TO PHQ QUESTIONS 1-9: 0
1. LITTLE INTEREST OR PLEASURE IN DOING THINGS: 0
SUM OF ALL RESPONSES TO PHQ QUESTIONS 1-9: 0
SUM OF ALL RESPONSES TO PHQ9 QUESTIONS 1 & 2: 0
SUM OF ALL RESPONSES TO PHQ QUESTIONS 1-9: 0
2. FEELING DOWN, DEPRESSED OR HOPELESS: 0
SUM OF ALL RESPONSES TO PHQ QUESTIONS 1-9: 0

## 2022-03-09 ASSESSMENT — ENCOUNTER SYMPTOMS
RHINORRHEA: 0
VOMITING: 0
DIARRHEA: 1
CHEST TIGHTNESS: 0
BLOOD IN STOOL: 0
ABDOMINAL DISTENTION: 0
STRIDOR: 0
WHEEZING: 0
COUGH: 0
SORE THROAT: 0
NAUSEA: 1
SHORTNESS OF BREATH: 0
CONSTIPATION: 0
ABDOMINAL PAIN: 1

## 2022-03-09 ASSESSMENT — SOCIAL DETERMINANTS OF HEALTH (SDOH): HOW HARD IS IT FOR YOU TO PAY FOR THE VERY BASICS LIKE FOOD, HOUSING, MEDICAL CARE, AND HEATING?: NOT HARD AT ALL

## 2022-03-09 NOTE — PATIENT INSTRUCTIONS
Patient Education        Diarrhea: Care Instructions  Overview     Diarrhea is loose, watery stools (bowel movements). The exact cause is often hard to find. Sometimes diarrhea is your body's way of getting rid of what caused an upset stomach. Viruses, food poisoning, and many medicines can cause diarrhea. Some people get diarrhea in response to emotional stress, anxiety, or certain foods. Almost everyone has diarrhea now and then. It usually isn't serious, and your stools will return to normal soon. The important thing to do is replace the fluids you have lost, so you can prevent dehydration. The doctor has checked you carefully, but problems can develop later. If you notice any problems or new symptoms, get medical treatment right away. Follow-up care is a key part of your treatment and safety. Be sure to make and go to all appointments, and call your doctor if you are having problems. It's also a good idea to know your test results and keep a list of the medicines you take. How can you care for yourself at home? · Watch for signs of dehydration, which means your body has lost too much water. Dehydration is a serious condition and should be treated right away. Signs of dehydration are:  ? Increasing thirst and dry eyes and mouth. ? Feeling faint or lightheaded. ? A smaller amount of urine than normal.  · To prevent dehydration, drink plenty of fluids. Choose water and other clear liquids until you feel better. If you have kidney, heart, or liver disease and have to limit fluids, talk with your doctor before you increase the amount of fluids you drink. · When you feel like eating, start with small amounts of food. · The doctor may recommend that you take over-the-counter medicine, such as loperamide (Imodium). Read and follow all instructions on the label. Do not use this medicine if you have bloody diarrhea, a high fever, or other signs of serious illness.  Call your doctor if you think you are having a problem with your medicine. When should you call for help? Call 911 anytime you think you may need emergency care. For example, call if:    · You passed out (lost consciousness).     · Your stools are maroon or very bloody. Call your doctor now or seek immediate medical care if:    · You are dizzy or lightheaded, or you feel like you may faint.     · Your stools are black and look like tar, or they have streaks of blood.     · You have new or worse belly pain.     · You have symptoms of dehydration, such as:  ? Dry eyes and a dry mouth. ? Passing only a little urine. ? Cannot keep fluids down.     · You have a new or higher fever. Watch closely for changes in your health, and be sure to contact your doctor if:    · Your diarrhea is getting worse.     · You see pus in the diarrhea.     · You are not getting better after 2 days (48 hours). Where can you learn more? Go to [email or web address]. org and sign in to your [employer] account. Enter P416 in the [employer] box to learn more about \"Diarrhea: Care Instructions. \"     If you do not have an account, please click on the \"Sign Up Now\" link. Current as of: July 1, 2021               Content Version: 13.1  © 2006-2021 [employer] Inc[employer]. Care instructions adapted under license by [facility] ([facility]). If you have questions about a medical condition or this instruction, always ask your healthcare professional. [place] [address] any warranty or liability for your use of this information.

## 2022-03-09 NOTE — PROGRESS NOTES
This visit began at [facility]  Patient has been screened to determine that this visit qualifies for a \"Telephone Visit\". Patient is currently established with the current medical practice, the condition being reviewed was not addressed within the previous 7 days and is not likely be determined to need a procedure within the next 24 hours. This visit was via telephone due to the restrictions of the COVID-19 pandemic. All issues as below were discussed and addressed but no physical exam was performed. It was felt the patient should be evaluated in the clinic there will be comment below demonstrating they were directed there. The patient is aware and has given verbal consent to be billed for this telephone encounter. Chief Complaint   Patient presents with    Diarrhea     X 2 weeks    Nausea     X 2 weeks with decreased appetite, no vomiting       HPI  Patient is on a telephone visit for c/o diarrhea, generalized abdominal pain, and nausea. Reports he is having 5-10 watery stools per day and is waking up at night to have BM. He is feeling fatigued from waking up t/o the night. He denies any gas or bloating. Denies any blood or mucus in the stool. Abdomen is sore when he presses on it. Denies any vomiting but it having a lot of reflux. He has never had IBS or any other bowel issues in the past.  Says he has been taking Paxil for the last 4 months. No other changes to medications or diet. No recent travel. Says he had appendix removed last year. Reports chills but no fever or muscle aches. He denies any recent ANTB use. States that his water filter was broke recently and he was not aware so could have picked something up there. Appetite has been decreased and he is only eating a few small meals a day.          PMH:    Current Outpatient Medications on File Prior to Visit   Medication Sig Dispense Refill    PARoxetine (PAXIL CR) 25 MG extended release tablet Take 1 tablet by mouth daily      clindamycin-tretinoin (ZIANA) 1.2-0.025 % gel Apply topically daily       Cholecalciferol (VITAMIN D3) 50 MCG (2000 UT) CAPS Take by mouth daily       No current facility-administered medications on file prior to visit. Past Medical History:   Diagnosis Date    Asthma      Past Surgical History:   Procedure Laterality Date    LAPAROSCOPIC APPENDECTOMY N/A 3/13/2021    APPENDECTOMY LAPAROSCOPIC performed by Vince Gaspar MD at [facility] EXTRACTION       Social History     Socioeconomic History    Marital status: Single     Spouse name: Not on file    Number of children: Not on file    Years of education: Not on file    Highest education level: Not on file   Occupational History    Not on file   Tobacco Use    Smoking status: Never Smoker    Smokeless tobacco: Never Used   Substance and Sexual Activity    Alcohol use: Not Currently    Drug use: Never    Sexual activity: Not Currently   Other Topics Concern    Not on file   Social History Narrative    Not on file     Social Determinants of Health     Financial Resource Strain: Low Risk     Difficulty of Paying Living Expenses: Not hard at all   Food Insecurity: No Food Insecurity    Worried About [facility] in the Last Year: Never true    [facility] in the Last Year: Never true   Transportation Needs: No Transportation Needs    Lack of Transportation (Medical): No    Lack of Transportation (Non-Medical):  No   Physical Activity:     Days of Exercise per Week: Not on file    Minutes of Exercise per Session: Not on file   Stress:     Feeling of Stress : Not on file   Social Connections:     Frequency of Communication with Friends and Family: Not on file    Frequency of Social Gatherings with Friends and Family: Not on file    Attends Buddhist Services: Not on file    Active Member of Clubs or Organizations: Not on file    Attends Club or Organization Meetings: Not on file    Marital Status: Not on file   Intimate Partner Violence:     Fear of Current or Ex-Partner: Not on file    Emotionally Abused: Not on file    Physically Abused: Not on file    Sexually Abused: Not on file   Housing Stability:     Unable to Pay for Housing in the Last Year: Not on file    Number of [place] in the Last Year: Not on file    Unstable Housing in the Last Year: Not on file     History reviewed. No pertinent family history. Allergies:  Seasonal    Review of Systems   Constitutional: Positive for appetite change, chills and fatigue. Negative for activity change, diaphoresis, fever and unexpected weight change. HENT: Negative for congestion, ear pain, postnasal drip, rhinorrhea and sore throat. Respiratory: Negative for cough, chest tightness, shortness of breath, wheezing and stridor. Cardiovascular: Negative for chest pain. Gastrointestinal: Positive for abdominal pain, diarrhea and nausea. Negative for abdominal distention, blood in stool, constipation and vomiting. Musculoskeletal: Negative for arthralgias and myalgias. Skin: Negative for rash. Neurological: Negative for dizziness, weakness, light-headedness and headaches. PHYSICAL EXAM / RESULTS    There were no vitals taken for this visit. Physical Exam  Constitutional:       General: He is awake. Pulmonary:      Effort: Pulmonary effort is normal.   Neurological:      Mental Status: He is alert and oriented to person, place, and time. Psychiatric:         Behavior: Behavior is cooperative. Vitals signs: pt does not have the proper equipment to take all VS.    The physical is not performed due to the visit being a telephone counter as indicated due to the restrictions of the COVID-19 pandemic    No results found for this or any previous visit (from the past 2016 hour(s)). Assessment:       Diagnosis Orders   1.  Diarrhea, unspecified type  Gastrointestinal Panel by DNA    O&P Panel ([facility]) #1 Clostridium Difficile Toxin/Antigen    Cryptosporidium Antigen, Stool         Orders Placed This Encounter   Procedures    Gastrointestinal Panel by DNA     Standing Status:   Future     Standing Expiration Date:   3/9/2023    O&P Panel ([employer] [facility]) #1     Standing Status:   Future     Standing Expiration Date:   3/9/2023    Clostridium Difficile Toxin/Antigen     Standing Status:   Future     Standing Expiration Date:   3/9/2023    Cryptosporidium Antigen, Stool     Standing Status:   Future     Standing Expiration Date:   3/9/2023         Plan:   No follow-ups on file. There are no Patient Instructions on file for this visit. Patient has has diarrhea for the last 2 weeks along with nausea, chills, and cramping/pain. Advised patient will check stool studies and if negative would suggest CT scan and seeing GI. Advised patient to report to ER with any worsening sx. Advised to stay hydrated and follow bland diet and cont with smaller meals. Advised may try some Pepto for the indigestion but would avoid any imodium until stool studies are back. Advised patient will call with stool study results and treat accordingly. Advised to call with any questions or concerns.     This visit ended at 1418    Electronically signed by CHELLE Warner - CNP

## 2022-03-10 ENCOUNTER — HOSPITAL ENCOUNTER (OUTPATIENT)
Age: 22
Setting detail: SPECIMEN
Discharge: HOME | End: 2022-03-10
Payer: COMMERCIAL

## 2022-03-10 DIAGNOSIS — R19.7 DIARRHEA, UNSPECIFIED TYPE: ICD-10-CM

## 2022-03-10 PROCEDURE — 87209 SMEAR COMPLEX STAIN: CPT

## 2022-03-10 PROCEDURE — 87177 OVA AND PARASITES SMEARS: CPT

## 2022-03-10 PROCEDURE — 0097U HC GI PTHGN MULT REV TRANS & AMP PRB TECH 22 TRGT: CPT

## 2022-03-10 PROCEDURE — 87324 CLOSTRIDIUM AG IA: CPT

## 2022-03-10 PROCEDURE — 87449 NOS EACH ORGANISM AG IA: CPT

## 2022-03-10 PROCEDURE — 87328 CRYPTOSPORIDIUM AG IA: CPT

## 2022-03-11 DIAGNOSIS — R19.7 DIARRHEA, UNSPECIFIED TYPE: Primary | ICD-10-CM

## 2022-03-11 LAB
ADENOVIRUS F 40 41 PCR: NOT DETECTED
ASTROVIRUS PCR: NOT DETECTED
C DIFF TOXIN/ANTIGEN: NORMAL
CAMPYLOBACTER PCR: NOT DETECTED
CRYPTOSPORIDIUM ANTIGEN STOOL: NORMAL
CRYPTOSPORIDIUM PCR: NOT DETECTED
CYCLOSPORA CAYETANENSIS PCR: NOT DETECTED
E COLI ENTEROAGGREGATIVE PCR: NOT DETECTED
E COLI ENTEROPATHOGENIC PCR: NOT DETECTED
E COLI ENTEROTOXIGENIC PCR: NOT DETECTED
E COLI SHIGELLA/ENTEROINVASIVE PCR: NOT DETECTED
ENTAMOEBA HISTOLYTICA PCR: NOT DETECTED
GIARDIA LAMBLIA PCR: NOT DETECTED
NOROVIRUS GI GII PCR: NOT DETECTED
PLESIOMONAS SHIGELLOIDES PCR: NOT DETECTED
ROTAVIRUS A PCR: NOT DETECTED
SALMONELLA PCR: NOT DETECTED
SAPOVIRUS PCR: NOT DETECTED
SHIGA-LIKE TOXIN-PRODUCING E. COLI (STEC) STX1/STX2: NOT DETECTED
VIBRIO CHOLERAE PCR: NOT DETECTED
VIBRIO PCR: NOT DETECTED
YERSINIA ENTEROCOLITICA PCR: NOT DETECTED

## 2022-03-15 LAB — INTERPRETATION: NEGATIVE

## 2022-03-18 ENCOUNTER — HOSPITAL ENCOUNTER (OUTPATIENT)
Dept: LAB | Age: 22
Discharge: HOME | End: 2022-03-18
Payer: COMMERCIAL

## 2022-03-18 ENCOUNTER — OFFICE VISIT (OUTPATIENT)
Dept: GASTROENTEROLOGY | Age: 22
End: 2022-03-18
Payer: COMMERCIAL

## 2022-03-18 VITALS — HEIGHT: 73 IN | OXYGEN SATURATION: 100 % | WEIGHT: 178 LBS | BODY MASS INDEX: 23.59 KG/M2 | HEART RATE: 65 BPM

## 2022-03-18 DIAGNOSIS — R19.7 DIARRHEA, UNSPECIFIED TYPE: ICD-10-CM

## 2022-03-18 DIAGNOSIS — R19.7 DIARRHEA, UNSPECIFIED TYPE: Primary | ICD-10-CM

## 2022-03-18 PROCEDURE — 1036F TOBACCO NON-USER: CPT | Performed by: SPECIALIST

## 2022-03-18 PROCEDURE — 83516 IMMUNOASSAY NONANTIBODY: CPT

## 2022-03-18 PROCEDURE — G8420 CALC BMI NORM PARAMETERS: HCPCS | Performed by: SPECIALIST

## 2022-03-18 PROCEDURE — G8427 DOCREV CUR MEDS BY ELIG CLIN: HCPCS | Performed by: SPECIALIST

## 2022-03-18 PROCEDURE — G8484 FLU IMMUNIZE NO ADMIN: HCPCS | Performed by: SPECIALIST

## 2022-03-18 PROCEDURE — 99204 OFFICE O/P NEW MOD 45 MIN: CPT | Performed by: SPECIALIST

## 2022-03-18 RX ORDER — SODIUM, POTASSIUM,MAG SULFATES 17.5-3.13G
SOLUTION, RECONSTITUTED, ORAL ORAL
Qty: 354 ML | Refills: 0 | Status: SHIPPED | OUTPATIENT
Start: 2022-03-18 | End: 2022-05-26

## 2022-03-18 ASSESSMENT — ENCOUNTER SYMPTOMS
DIARRHEA: 1
BLOOD IN STOOL: 0
ANAL BLEEDING: 0
ABDOMINAL PAIN: 0
EYES NEGATIVE: 1
VOMITING: 0
CONSTIPATION: 0
RESPIRATORY NEGATIVE: 1
RECTAL PAIN: 0
ABDOMINAL DISTENTION: 0
NAUSEA: 0

## 2022-03-18 NOTE — PROGRESS NOTES
Gastroenterology Clinic Visit    Yasir Rubio  [id number]  Chief Complaint   Patient presents with    New Patient     Pt establishing care    Abdominal Pain     Pt c/o poor appetite, nausea without vomiting, diarrhea, increased urgency. Up to 10 bm's daily, without blood     Gastroesophageal Reflux     Takes TUMS prn       HPI: 24 y.o. male presents to the clinic with history of altered bowel habits since last 2 months, patient reports having intermittent diarrhea with a frequency of about 10-15 times a day and also has nocturnal bowel movement. Stool is watery with no blood or mucus. Also reports having normal BM occasionally. No relation to any particular food. No history of recent travel. No history of any antibiotics use prior to diarrhea. No sharp abdominal pain feels nauseous but no emesis, also has been experiencing symptoms of GERD. History of 10 pound weight loss. No history of oral ulcerations arthralgia or skin rash or ocular disease. Has been on SSRI since November. Social history does not smoke does not drink alcohol. Family history negative for IBD or celiac sprue and grandfather had colon cancer. Surgical history includes appendectomy. Review of Systems   Constitutional: Negative. HENT: Negative. Eyes: Negative. Respiratory: Negative. Gastrointestinal: Positive for diarrhea. Negative for abdominal distention, abdominal pain, anal bleeding, blood in stool, constipation, nausea, rectal pain and vomiting. History of GERD   Genitourinary: Negative. History of IgA nephropathy being followed by Dr. Dannielle Thakur. Had microscopic hematuria which led to this diagnosis   Musculoskeletal: Negative. Neurological: Negative. Psychiatric/Behavioral: Negative.          Past Medical History:   Diagnosis Date    Asthma       Past Surgical History:   Procedure Laterality Date    LAPAROSCOPIC APPENDECTOMY N/A 3/13/2021    APPENDECTOMY LAPAROSCOPIC performed by Lieutenant Juarez MD at [address] [place] [address] EXTRACTION       Current Outpatient Medications on File Prior to Visit   Medication Sig Dispense Refill    PARoxetine (PAXIL CR) 25 MG extended release tablet Take 1 tablet by mouth daily      clindamycin-tretinoin (ZIANA) 1.2-0.025 % gel Apply topically daily       Cholecalciferol (VITAMIN D3) 50 MCG (2000 UT) CAPS Take by mouth daily       No current facility-administered medications on file prior to visit. History reviewed. No pertinent family history. Social History     Socioeconomic History    Marital status: Single     Spouse name: None    Number of children: None    Years of education: None    Highest education level: None   Occupational History    None   Tobacco Use    Smoking status: Never Smoker    Smokeless tobacco: Never Used   Substance and Sexual Activity    Alcohol use: Not Currently    Drug use: Never    Sexual activity: Not Currently   Other Topics Concern    None   Social History Narrative    None     Social Determinants of Health     Financial Resource Strain: Low Risk     Difficulty of Paying Living Expenses: Not hard at all   Food Insecurity: No Food Insecurity    Worried About Running Out of Food in the Last Year: Never true    Clint of Food in the Last Year: Never true   Transportation Needs: No Transportation Needs    Lack of Transportation (Medical): No    Lack of Transportation (Non-Medical):  No   Physical Activity:     Days of Exercise per Week: Not on file    Minutes of Exercise per Session: Not on file   Stress:     Feeling of Stress : Not on file   Social Connections:     Frequency of Communication with Friends and Family: Not on file    Frequency of Social Gatherings with Friends and Family: Not on file    Attends Restoration Services: Not on file    Active Member of Clubs or Organizations: Not on file    Attends Club or Organization Meetings: Not on file    Marital Status: Not on file   Intimate Partner Violence:     Fear of Current or Ex-Partner: Not on file    Emotionally Abused: Not on file    Physically Abused: Not on file    Sexually Abused: Not on file   Housing Stability:     Unable to Pay for Housing in the Last Year: Not on file    Number of [place] in the Last Year: Not on file    Unstable Housing in the Last Year: Not on file       Pulse 65, height 6' 1\" (1.854 m), weight 178 lb (80.7 kg), SpO2 100 %. Physical Exam  Constitutional:       Appearance: He is well-developed. HENT:      Head: Normocephalic. Eyes:      Pupils: Pupils are equal, round, and reactive to light. Cardiovascular:      Rate and Rhythm: Normal rate and regular rhythm. Heart sounds: Normal heart sounds. Pulmonary:      Effort: Pulmonary effort is normal.      Breath sounds: Normal breath sounds. Abdominal:      General: Bowel sounds are normal.      Palpations: Abdomen is soft. Comments: Soft nontender no palpable mass   Skin:     General: Skin is warm and dry. Neurological:      Mental Status: He is alert. Laboratory, Pathology, Radiology reviewed indetail with relevant important investigations summarized below:  Lab Results   Component Value Date    WBC 4.6 04/22/2021    HGB 15.2 04/22/2021    HCT 43.7 04/22/2021    MCV 82.4 04/22/2021     04/22/2021     Lab Results   Component Value Date    ALT 13 03/12/2021    AST 13 03/12/2021    ALKPHOS 93 03/12/2021    BILITOT 0.8 (H) 03/12/2021       No results found. Endoscopic investigations:     Assessmentand Plan:  24 y.o. male with history of diarrhea since last 2 months. Stool studies for C. difficile toxin and ova parasites and stool cultures were negative. Patient has history of IgA nephropathy. Rule out IBD or celiac sprue. We will schedule colonoscopy and also order celiac disease panel. Diagnosis Orders   1.  Diarrhea, unspecified type  Celiac Panel Reflex to Titer    Endoscopy, colon, diagnostic     Return in about 2 months (around 5/18/2022). Henrique Driscoll MD   Staff Gastroenterologist  [facility]    Please note this report has been partially produced using speech recognition software and may cause contain errors related to thatsystem including grammar, punctuation and spelling as well as words and phrases that may seem inappropriate. If there are questions or concerns please feel free to contact me to clarify.

## 2022-03-22 LAB
CELIAC PANEL: 36 UNITS (ref 0–19)
TISSUE TRANSGLUTAMINASE IGA: <2 U/ML (ref 0–3)

## 2022-03-23 LAB — GLIADIN ANTIBODIES IGA: 10 UNITS (ref 0–19)

## 2022-03-24 LAB
GLIADIN ANTIBODIES IGG: 21 UNITS (ref 0–19)
TISSUE TRANSGLUTAMINASE ANTIBODY: 3 U/ML (ref 0–5)

## 2022-04-14 ENCOUNTER — HOSPITAL ENCOUNTER (OUTPATIENT)
Dept: LAB | Age: 22
Discharge: HOME | End: 2022-04-14
Payer: COMMERCIAL

## 2022-04-14 LAB
ALBUMIN SERPL-MCNC: 4.6 G/DL (ref 3.5–4.6)
ANION GAP SERPL CALCULATED.3IONS-SCNC: 14 MEQ/L (ref 9–15)
BACTERIA: NEGATIVE /HPF
BILIRUBIN URINE: NEGATIVE
BLOOD, URINE: ABNORMAL
BUN BLDV-MCNC: 11 MG/DL (ref 6–20)
CALCIUM SERPL-MCNC: 9.9 MG/DL (ref 8.5–9.9)
CHLORIDE BLD-SCNC: 100 MEQ/L (ref 95–107)
CLARITY: CLEAR
CO2: 26 MEQ/L (ref 20–31)
COLOR: ABNORMAL
CREAT SERPL-MCNC: 1 MG/DL (ref 0.7–1.2)
CREATININE URINE: 394.2 MG/DL
EPITHELIAL CELLS, UA: ABNORMAL /HPF (ref 0–5)
GFR AFRICAN AMERICAN: >60
GFR NON-AFRICAN AMERICAN: >60
GLUCOSE BLD-MCNC: 83 MG/DL (ref 70–99)
GLUCOSE URINE: NEGATIVE MG/DL
HYALINE CASTS: ABNORMAL /HPF (ref 0–5)
KETONES, URINE: ABNORMAL MG/DL
LEUKOCYTE ESTERASE, URINE: NEGATIVE
NITRITE, URINE: NEGATIVE
PARATHYROID HORMONE INTACT: 37.3 PG/ML (ref 15–65)
PH UA: 5.5 (ref 5–9)
PHOSPHORUS: 3.1 MG/DL (ref 2.3–4.8)
POTASSIUM SERPL-SCNC: 4 MEQ/L (ref 3.4–4.9)
PROTEIN PROTEIN: 73 MG/DL
PROTEIN UA: 100 MG/DL
PROTEIN/CREAT RATIO: 0.2 ML/ML
PROTEIN/CREAT RATIO: 0.2 ML/ML (ref 0–0.2)
RBC UA: ABNORMAL /HPF (ref 0–5)
SODIUM BLD-SCNC: 140 MEQ/L (ref 135–144)
SPECIFIC GRAVITY UA: 1.03 (ref 1–1.03)
UROBILINOGEN, URINE: 0.2 E.U./DL
WBC UA: ABNORMAL /HPF (ref 0–5)

## 2022-04-14 PROCEDURE — 81001 URINALYSIS AUTO W/SCOPE: CPT

## 2022-04-14 PROCEDURE — 84156 ASSAY OF PROTEIN URINE: CPT

## 2022-04-14 PROCEDURE — 80069 RENAL FUNCTION PANEL: CPT

## 2022-04-14 PROCEDURE — 36415 COLL VENOUS BLD VENIPUNCTURE: CPT

## 2022-04-14 PROCEDURE — 83970 ASSAY OF PARATHORMONE: CPT

## 2022-04-14 PROCEDURE — 82306 VITAMIN D 25 HYDROXY: CPT

## 2022-04-15 LAB — VITAMIN D 25-HYDROXY: 16.7 NG/ML

## 2022-05-26 ENCOUNTER — HOSPITAL ENCOUNTER (OUTPATIENT)
Age: 22
Setting detail: SPECIMEN
Discharge: HOME | End: 2022-05-26

## 2022-05-26 ENCOUNTER — OFFICE VISIT (OUTPATIENT)
Dept: FAMILY MEDICINE CLINIC | Age: 22
End: 2022-05-26
Payer: COMMERCIAL

## 2022-05-26 ENCOUNTER — HOSPITAL ENCOUNTER (OUTPATIENT)
Dept: LAB | Age: 22
Discharge: HOME | End: 2022-05-26
Payer: COMMERCIAL

## 2022-05-26 VITALS
BODY MASS INDEX: 23.91 KG/M2 | DIASTOLIC BLOOD PRESSURE: 78 MMHG | OXYGEN SATURATION: 97 % | TEMPERATURE: 97.8 F | SYSTOLIC BLOOD PRESSURE: 112 MMHG | HEIGHT: 73 IN | WEIGHT: 180.4 LBS | HEART RATE: 96 BPM

## 2022-05-26 DIAGNOSIS — R59.0 LYMPHADENOPATHY, CERVICAL: ICD-10-CM

## 2022-05-26 DIAGNOSIS — J02.9 ACUTE PHARYNGITIS, UNSPECIFIED ETIOLOGY: Primary | ICD-10-CM

## 2022-05-26 DIAGNOSIS — J02.9 ACUTE PHARYNGITIS, UNSPECIFIED ETIOLOGY: ICD-10-CM

## 2022-05-26 PROBLEM — N18.1 STAGE 1 CHRONIC KIDNEY DISEASE: Status: ACTIVE | Noted: 2022-05-26

## 2022-05-26 LAB
BASOPHILS ABSOLUTE: 0 K/UL (ref 0–0.2)
BASOPHILS RELATIVE PERCENT: 0.4 %
EOSINOPHILS ABSOLUTE: 0.1 K/UL (ref 0–0.7)
EOSINOPHILS RELATIVE PERCENT: 2.9 %
HCT VFR BLD CALC: 49.6 % (ref 42–52)
HEMOGLOBIN: 16.6 G/DL (ref 14–18)
LYMPHOCYTES ABSOLUTE: 2 K/UL (ref 1–4.8)
LYMPHOCYTES RELATIVE PERCENT: 41.2 %
MCH RBC QN AUTO: 28.7 PG (ref 27–31.3)
MCHC RBC AUTO-ENTMCNC: 33.4 % (ref 33–37)
MCV RBC AUTO: 86.1 FL (ref 80–100)
MONOCYTES ABSOLUTE: 0.5 K/UL (ref 0.2–0.8)
MONOCYTES RELATIVE PERCENT: 10.2 %
NEUTROPHILS ABSOLUTE: 2.2 K/UL (ref 1.4–6.5)
NEUTROPHILS RELATIVE PERCENT: 45.3 %
PDW BLD-RTO: 12.6 % (ref 11.5–14.5)
PLATELET # BLD: 273 K/UL (ref 130–400)
RBC # BLD: 5.76 M/UL (ref 4.7–6.1)
WBC # BLD: 4.9 K/UL (ref 4.8–10.8)

## 2022-05-26 PROCEDURE — 99214 OFFICE O/P EST MOD 30 MIN: CPT

## 2022-05-26 PROCEDURE — 86308 HETEROPHILE ANTIBODY SCREEN: CPT

## 2022-05-26 PROCEDURE — 85025 COMPLETE CBC W/AUTO DIFF WBC: CPT

## 2022-05-26 PROCEDURE — 87070 CULTURE OTHR SPECIMN AEROBIC: CPT

## 2022-05-26 PROCEDURE — 86663 EPSTEIN-BARR ANTIBODY: CPT

## 2022-05-26 PROCEDURE — G8427 DOCREV CUR MEDS BY ELIG CLIN: HCPCS

## 2022-05-26 PROCEDURE — 86665 EPSTEIN-BARR CAPSID VCA: CPT

## 2022-05-26 PROCEDURE — G8420 CALC BMI NORM PARAMETERS: HCPCS

## 2022-05-26 PROCEDURE — 86664 EPSTEIN-BARR NUCLEAR ANTIGEN: CPT

## 2022-05-26 PROCEDURE — 36415 COLL VENOUS BLD VENIPUNCTURE: CPT

## 2022-05-26 PROCEDURE — 87496 CYTOMEG DNA AMP PROBE: CPT

## 2022-05-26 PROCEDURE — 1036F TOBACCO NON-USER: CPT

## 2022-05-26 RX ORDER — LIDOCAINE HYDROCHLORIDE 20 MG/ML
5 SOLUTION OROPHARYNGEAL
Qty: 100 ML | Refills: 0 | Status: SHIPPED | OUTPATIENT
Start: 2022-05-26

## 2022-05-26 RX ORDER — OMEPRAZOLE 40 MG/1
CAPSULE, DELAYED RELEASE ORAL
COMMUNITY
Start: 2022-05-02

## 2022-05-26 NOTE — PATIENT INSTRUCTIONS
Patient Education        Mononucleosis: Care Instructions  Your Care Instructions     Mononucleosis, also called mono, is an infection that is usually caused by the Surekha-Barr virus. Mono is spread through contact with saliva, mucus from thenose and throat, and sometimes tears or blood. You can get mono by kissing a person who is infected. Or you may get it by sharing a drinking glass or eating utensils with someone who has mono. Thatperson may not be sick at the time or may have had mono long before. Mono may cause your spleen to swell. The spleen is an organ in the upper left side of your belly. A blow to the belly can cause a swollen spleen to breakopen. In very rare cases, the spleen may burst on its own. Most people recover fully after several weeks. But it may take several months before your normal energy is back. The lymph nodes in your neck may be larger than normal for up to 1 month. Getting lots of rest and keeping your schedulelight will help you feel better. Time helps you recover. Follow-up care is a key part of your treatment and safety. Be sure to make and go to all appointments, and call your doctor if you are having problems. It's also a good idea to know your test results and keep alist of the medicines you take. How can you care for yourself at home?  Get plenty of rest. Stay in bed until you feel well enough to be up.  Drink plenty of fluids. If you have kidney, heart, or liver disease and have to limit fluids, talk with your doctor before you increase the amount of fluids you drink.  Take your medicines exactly as prescribed. Call your doctor if you think you are having a problem with your medicine.  For a sore throat, suck on lozenges or gargle with salt water. To make salt water, mix 1 teaspoon of salt in 8 ounces of warm water.    Take an over-the-counter pain medicine, such as acetaminophen (Tylenol), ibuprofen (Advil, Motrin), or naproxen (Aleve), for a sore throat or headache or to lower a fever. Read and follow all instructions on the label.  Do not take two or more pain medicines at the same time unless the doctor told you to. Many pain medicines have acetaminophen, which is Tylenol. Too much acetaminophen (Tylenol) can be harmful.  Do not play contact sports for 4 weeks. Do not lift anything heavy. Too much activity increases the chance that your spleen may break open (rupture).  Try not to spread the virus to others. Do not kiss and don't share dishes, glasses, eating utensils, or toothbrushes for at least a few weeks. The virus is spread when saliva from an infected person gets in another person's mouth. It's hard to know how long you may be contagious.  If you know you have mono, do not donate blood. There is a chance of spreading the virus through blood products. When should you call for help? Call 911 anytime you think you may need emergency care. For example, call if:     You passed out (lost consciousness). Call your doctor now or seek immediate medical care if:     You have new or worse belly pain.      You have signs of needing more fluids. You have sunken eyes and a dry mouth, and you pass only a little urine.      You are dizzy or lightheaded, or you feel like you may faint.      You cannot swallow fluids. Watch closely for changes in your health, and be sure to contact your doctor if:     You do not get better as expected. Where can you learn more? Go to [email or web address] org and sign in to your [employer] account. Enter T005 in the [facility] box to learn more about \"Mononucleosis: Care Instructions. \"     If you do not have an account, please click on the \"Sign Up Now\" link. Current as of: July 1, 2021               Content Version: 13.2  © 2006-2022 [employer]wise[employer] Inc[employer]. Care instructions adapted under license by [facility] [employer] [facility] ([facility]).  If you have questions about a medical condition or this instruction, always ask your healthcare professional. [place] [address] any warranty or liability for your use of this information. For the time being continue taking antibiotics that you have been prescribed. After your throat culture results are back in 24 to 48 hours I will call you and tell you whether you should continue. There is a suspicion your symptoms are caused by a mononucleosis infection. It is important to refrain from strenuous physical activity or contact sports.

## 2022-05-26 NOTE — PROGRESS NOTES
[facility] Encounter        ASSESSMENT/PLAN     Anupama Chou is a 24 y.o. male who presents with:  Illness for 2 weeks. Reports symptoms began with fatigue then developed sore throat and fever. He did have a rash that developed all over his body and then resolved at the beginning of symptoms. 1 week ago he was seen in [facility] had rapid strep and monotest performed both were negative. 3 days ago he began taking Augmentin prescribed by [facility]. He has had no resolution of symptoms with antibiotics. On examination tonsils are enlarged 2+ bilaterally with white exudate noted. He has anterior enlarged cervical nodes bilaterally up to 2 cm diameter. Lung sounds are clear throughout. Abdomen is soft nontender. Spleen is not palpable. POC Monospot test repeat is negative. 1. Acute pharyngitis, unspecified etiology    2. Lymphadenopathy, cervical      Advised patient to continue taking Augmentin for the time being. Throat culture collected and sent for further evaluation. Orders for CMV CBC and Surekha-Bass panel placed. Patient provided prescription for viscous lidocaine to treat throat pain. PATIENT REFERRED TO:  Return in about 4 weeks (around 6/23/2022) for Follow up with PCP. DISCHARGE MEDICATIONS:  New Prescriptions    LIDOCAINE VISCOUS HCL (XYLOCAINE) 2 % SOLN SOLUTION    Take 5 mLs by mouth every 3 hours as needed for Pain     Cannot display discharge medications since this is not an admission. CHELLE De - CNP    CHIEF COMPLAINT       Chief Complaint   Patient presents with    Cold Exposure     Pt has been feeling sore throat, fatigue, cough, congestion. Symptoms haven't been very consistent.  Pt has gotten a bodily rash all over    Covid Testing     Pt did a PCR test which was negative almost a week ago         [address] [place] [address] Drive     Review of Systems   Constitutional: Negative for chills, diaphoresis, fatigue and fever. HENT: Positive for sore throat. Negative for congestion, ear pain, hearing loss, postnasal drip, rhinorrhea, sinus pressure and sinus pain. Eyes: Negative. Respiratory: Negative for cough, chest tightness, shortness of breath and wheezing. Cardiovascular: Negative for chest pain and palpitations. Gastrointestinal: Negative for abdominal pain, diarrhea, nausea and vomiting. Endocrine: Negative. Musculoskeletal: Positive for neck pain. Negative for arthralgias and myalgias. Skin: Negative for rash. Neurological: Negative for dizziness, weakness, light-headedness and headaches. Hematological: Negative for adenopathy. Psychiatric/Behavioral: Negative. OBJECTIVE/PHYSICAL EXAM     Physical Exam  Constitutional:       General: He is not in acute distress. Appearance: Normal appearance. He is not ill-appearing, toxic-appearing or diaphoretic. HENT:      Head: Normocephalic. Right Ear: Tympanic membrane, ear canal and external ear normal. No middle ear effusion. There is no impacted cerumen. No mastoid tenderness. Tympanic membrane is not perforated, erythematous or bulging. Left Ear: Tympanic membrane, ear canal and external ear normal.  No middle ear effusion. There is no impacted cerumen. No mastoid tenderness. Tympanic membrane is not perforated, erythematous or bulging. Nose: No congestion or rhinorrhea. Mouth/Throat:      Mouth: Mucous membranes are moist.      Pharynx: Oropharynx is clear. No pharyngeal swelling, oropharyngeal exudate or posterior oropharyngeal erythema. Tonsils: Tonsillar exudate present. No tonsillar abscesses. 2+ on the right. 2+ on the left. Eyes:      General:         Right eye: No discharge. Left eye: No discharge. Cardiovascular:      Rate and Rhythm: Normal rate and regular rhythm. Pulses: Normal pulses. Heart sounds: Normal heart sounds. No murmur heard. No gallop.     Pulmonary: Effort: Pulmonary effort is normal. No respiratory distress. Breath sounds: Normal breath sounds. No stridor. No wheezing, rhonchi or rales. Chest:      Chest wall: No tenderness. Abdominal:      General: Abdomen is flat. Musculoskeletal:      Cervical back: No rigidity or tenderness. Lymphadenopathy:      Cervical: Cervical adenopathy present. Right cervical: Superficial cervical adenopathy present. No posterior cervical adenopathy. Left cervical: Superficial cervical adenopathy and posterior cervical adenopathy present. Skin:     General: Skin is warm and dry. Capillary Refill: Capillary refill takes less than 2 seconds. Coloration: Skin is not pale. Neurological:      Mental Status: He is alert and oriented to person, place, and time. Mental status is at baseline. Psychiatric:         Mood and Affect: Mood normal.         Behavior: Behavior normal.         VITALS  BP: 112/78, Temp: 97.8 °F (36.6 °C), Temp Source: Temporal, Heart Rate: 96,  , SpO2: 97 %      PAST MEDICAL HISTORY         Diagnosis Date    Asthma      SURGICAL HISTORY     Patient  has a past surgical history that includes [place] tooth extraction and laparoscopic appendectomy (N/A, 3/13/2021). CURRENT MEDICATIONS       Previous Medications    CHOLECALCIFEROL (VITAMIN D3) 50 MCG (2000 UT) CAPS    Take by mouth daily    CLINDAMYCIN-TRETINOIN (ZIANA) 1.2-0.025 % GEL    Apply topically daily     OMEPRAZOLE (PRILOSEC) 40 MG DELAYED RELEASE CAPSULE    TAKE 1 CAPSULE BY MOUTH EVERY DAY BEFORE BREAKFAST    PAROXETINE (PAXIL CR) 25 MG EXTENDED RELEASE TABLET    Take 1 tablet by mouth daily     ALLERGIES     Patient is is allergic to gluten, ibuprofen, and seasonal.  FAMILY HISTORY     Patient'sfamily history is not on file. HISTORY     Patient  reports that he has never smoked. He has never used smokeless tobacco. He reports previous alcohol use. He reports that he does not use drugs.   READY CARE COURSE     Orders Placed This Encounter   Procedures    Culture, Throat     Standing Status:   Future     Number of Occurrences:   1     Standing Expiration Date:   5/26/2023    Cytomegalovirus (Cmv) By PCR     Standing Status:   Future     Number of Occurrences:   1     Standing Expiration Date:   5/26/2023   Maudine Drummer Virus (Ebv) Antibody Panel I     Standing Status:   Future     Number of Occurrences:   1     Standing Expiration Date:   5/26/2023    CBC with Auto Differential     Standing Status:   Future     Number of Occurrences:   1     Standing Expiration Date:   5/26/2023    POCT Infectious mononucleosis Abs (mono)        Labs:  No results found for this visit on 05/26/22. IMAGING:  No orders to display     Scheduled Meds:  Continuous Infusions:  PRN Meds:.

## 2022-05-28 LAB
EPSTEIN BARR VIRUS NUCLEAR AB IGG: 266 U/ML (ref 0–21.9)
EPSTEIN-BARR EARLY ANTIGEN ANTIBODY: <5 U/ML (ref 0–10.9)
EPSTEIN-BARR VCA IGG: 19.8 U/ML (ref 0–21.9)
EPSTEIN-BARR VCA IGM: 54.6 U/ML (ref 0–43.9)

## 2022-05-28 ASSESSMENT — ENCOUNTER SYMPTOMS
COUGH: 0
VOMITING: 0
SINUS PAIN: 0
SINUS PRESSURE: 0
SORE THROAT: 1
CHEST TIGHTNESS: 0
SHORTNESS OF BREATH: 0
DIARRHEA: 0
WHEEZING: 0
NAUSEA: 0
EYES NEGATIVE: 1
ABDOMINAL PAIN: 0
RHINORRHEA: 0

## 2022-05-29 LAB — THROAT CULTURE: NORMAL

## 2022-05-30 LAB
CYTOMEGALOVIRUS PCR DETECTION: NOT DETECTED
CYTOMEGALOVIRUS SOURCE: NORMAL

## 2025-02-19 NOTE — ANESTHESIA PRE PROCEDURE
Department of Anesthesiology  Preprocedure Note       Name:  Dioni Griffith   Age:  21 y.o.  :  2000                                          MRN:  [id number]         Date:  3/13/2021      Surgeon: Faisal López):  Nicole Navarro MD    Procedure: Procedure(s):  APPENDECTOMY LAPAROSCOPIC    Medications prior to admission:   Prior to Admission medications    Medication Sig Start Date End Date Taking?  Authorizing Provider   clindamycin-tretinoin (Ronit Rose Marie) 1.2-0.025 % gel Apply topically 2 times daily   Yes Historical Provider, MD   Cholecalciferol (VITAMIN D3) 50 MCG (2000) CAPS Take by mouth daily   Yes Historical Provider, MD       Current medications:    Current Facility-Administered Medications   Medication Dose Route Frequency Provider Last Rate Last Admin    lactated ringers infusion             meperidine (DEMEROL) injection 12.5 mg  12.5 mg Intravenous Q5 Min PRN Bernardo Castellanosnison, DO        fentaNYL (SUBLIMAZE) injection 50 mcg  50 mcg Intravenous Q10 Min PRN Bernardo [place]vale, DO        HYDROmorphone (DILAUDID) injection 0.5 mg  0.5 mg Intravenous Q10 Min PRN Bernardo Castellanosnison, DO        HYDROcodone-acetaminophen (NORCO) 5-325 MG per tablet 1 tablet  1 tablet Oral PRN Dimitri K Corinne, DO        Or    HYDROcodone-acetaminophen (NORCO) 5-325 MG per tablet 2 tablet  2 tablet Oral PRN Dimitri K Corinne, DO        ondansetron (ZOFRAN) injection 4 mg  4 mg Intravenous Once PRN Bernardo Fabio, DO        metoclopramide (REGLAN) injection 10 mg  10 mg Intravenous Once PRN Bernardo Castellanosnison, DO        diphenhydrAMINE (BENADRYL) injection 12.5 mg  12.5 mg Intravenous Once PRN Bernardo [place]vale, DO        dextrose 5 % and 0.45 % sodium chloride infusion   Intravenous Continuous Nicole Navarro  mL/hr at 217 New Bag at 21    morphine (PF) injection 1 mg  1 mg Intravenous Q4H PRN Nicole Navarro MD   1 mg at 21 0618    ondansetron (ZOFRAN) injection 4 mg  4 mg Intravenous Q6H PRN Donato Wynn MD        piperacillin-tazobactam (ZOSYN) 3,375 mg in dextrose 5 % 50 mL IVPB (mini-bag)  3,375 mg Intravenous Q8H Donato Wynn MD   Stopped at 03/13/21 0658    acetaminophen (TYLENOL) tablet 650 mg  650 mg Oral Q6H PRN Donato Wynn MD   650 mg at 03/12/21 2238     Facility-Administered Medications Ordered in Other Encounters   Medication Dose Route Frequency Provider Last Rate Last Admin    lactated ringers infusion    Continuous PRN Chasidy Chen DO   New Bag at 03/13/21 9457       Allergies: Allergies   Allergen Reactions    Seasonal        Problem List:    Patient Active Problem List   Diagnosis Code    Right lower quadrant abdominal pain R10.31       Past Medical History:        Diagnosis Date    Asthma        Past Surgical History:        Procedure Laterality Date    WISDOM TOOTH EXTRACTION         Social History:    Social History     Tobacco Use    Smoking status: Never Smoker    Smokeless tobacco: Never Used   Substance Use Topics    Alcohol use: Not Currently                                Counseling given: Not Answered      Vital Signs (Current):   Vitals:    03/12/21 2128 03/13/21 0806   BP: 135/78 127/66   Pulse: 98 79   Resp: 18 16   Temp: 98.2 °F (36.8 °C) 97.7 °F (36.5 °C)   TempSrc: Oral Oral   SpO2: 98% 100%                                              BP Readings from Last 3 Encounters:   03/13/21 127/66   03/12/21 (!) 151/109   03/02/21 126/74       NPO Status: Time of last liquid consumption: 0000                        Time of last solid consumption: 0000                        Date of last liquid consumption: 03/13/21                        Date of last solid food consumption: 03/13/21    BMI:   Wt Readings from Last 3 Encounters:   03/12/21 170 lb (77.1 kg)   03/02/21 170 lb (77.1 kg)   10/06/19 173 lb 3.2 oz (78.6 kg) (78 %, Z= 0.77)*     * Growth percentiles are based on CDC (Boys, 2-20 Years) data.      There is no height or weight on file to calculate BMI.    CBC:   Lab Results   Component Value Date    WBC 5.9 03/13/2021    RBC 4.91 03/13/2021    HGB 14.2 03/13/2021    HCT 40.9 03/13/2021    MCV 83.2 03/13/2021    RDW 12.4 03/13/2021     03/13/2021       CMP:   Lab Results   Component Value Date     03/12/2021    K 3.9 03/12/2021     03/12/2021    CO2 28 03/12/2021    BUN 11 03/12/2021    CREATININE 0.99 03/12/2021    GFRAA >60.0 03/12/2021    LABGLOM >60.0 03/12/2021    GLUCOSE 119 03/12/2021    PROT 8.0 03/12/2021    CALCIUM 10.0 03/12/2021    BILITOT 0.8 03/12/2021    ALKPHOS 93 03/12/2021    AST 13 03/12/2021    ALT 13 03/12/2021       POC Tests: No results for input(s): POCGLU, POCNA, POCK, POCCL, POCBUN, POCHEMO, POCHCT in the last 72 hours. Coags: No results found for: PROTIME, INR, APTT    HCG (If Applicable): No results found for: PREGTESTUR, PREGSERUM, HCG, HCGQUANT     ABGs: No results found for: PHART, PO2ART, ZHN8XUB, AZH3RVE, BEART, D5BVZCVS     Type & Screen (If Applicable):  No results found for: LABABO, LABRH    Drug/Infectious Status (If Applicable):  No results found for: HIV, HEPCAB    COVID-19 Screening (If Applicable):   Lab Results   Component Value Date    COVID19 Not Detected 03/12/2021         Anesthesia Evaluation  Patient summary reviewed and Nursing notes reviewed no history of anesthetic complications:   Airway: Mallampati: II  TM distance: >3 FB   Neck ROM: full  Mouth opening: > = 3 FB Dental: normal exam         Pulmonary:normal exam  breath sounds clear to auscultation  (+) asthma:                            Cardiovascular:Negative CV ROS  Exercise tolerance: good (>4 METS),           Rhythm: regular  Rate: normal           Beta Blocker:  Not on Beta Blocker         Neuro/Psych:   Negative Neuro/Psych ROS              GI/Hepatic/Renal: Neg GI/Hepatic/Renal ROS            Endo/Other: Negative Endo/Other ROS             Pt had PAT visit.        Abdominal:           Vascular: Patient states  slipped and fall this morning hit back of his head,  Patient denies dizziness, LOC, taking blood thinners c/o slight headache

## (undated) DEVICE — COVER LT HNDL BLU PLAS

## (undated) DEVICE — BAG SPEC REM 224ML W4XL6IN DIA10MM 1 HND GYN DISP ENDOPCH

## (undated) DEVICE — TROCAR: Brand: KII SHIELDED BLADED ACCESS SYSTEM

## (undated) DEVICE — CUTTER ENDOSCP L340MM LIN ARTC SGL STROKE FIRING ENDOPATH

## (undated) DEVICE — TROCAR: Brand: KII FIOS FIRST ENTRY

## (undated) DEVICE — INTENDED FOR TISSUE SEPARATION, AND OTHER PROCEDURES THAT REQUIRE A SHARP SURGICAL BLADE TO PUNCTURE OR CUT.: Brand: BARD-PARKER ® CARBON RIB-BACK BLADES

## (undated) DEVICE — WARMER SCP 2 ANTIFOG LAP DISP

## (undated) DEVICE — SPONGE,LAP,18"X18",DLX,XR,ST,5/PK,40/PK: Brand: MEDLINE

## (undated) DEVICE — SUTURE MCRYL SZ 4-0 L27IN ABSRB UD L19MM PS-2 1/2 CIR PRIM Y426H

## (undated) DEVICE — SUTURE VCRL SZ 3-0 L27IN ABSRB UD L26MM SH 1/2 CIR J416H

## (undated) DEVICE — Device

## (undated) DEVICE — VIAL ACCESS CANNULA,SMART TIP: Brand: MONOJECT

## (undated) DEVICE — COUNTER NDL 40 COUNT HLD 70 FOAM BLK ADH W/ MAG

## (undated) DEVICE — TOWEL,OR,DSP,ST,BLUE,STD,4/PK,20PK/CS: Brand: MEDLINE

## (undated) DEVICE — SINGLE PORT MANIFOLD: Brand: NEPTUNE 2

## (undated) DEVICE — LABEL MED MINI W/ MARKER

## (undated) DEVICE — RELOAD STPL SZ 0 L45MM DIA3.5MM 0DEG STD REG TISS BLU TI

## (undated) DEVICE — Z DUPLICATE USE 2431315 SET INSUF TBNG HI FLO W/ SMK EVAC FOR PNEUMOCLEAR

## (undated) DEVICE — ELECTRODE PT RET AD L9FT HI MOIST COND ADH HYDRGEL CORDED

## (undated) DEVICE — GOWN,AURORA,NONREINFORCED,LARGE: Brand: MEDLINE

## (undated) DEVICE — KIT,ANTI FOG,W/SPONGE & FLUID,SOFT PACK: Brand: MEDLINE

## (undated) DEVICE — APPLICATOR MEDICATED 26 CC SOLUTION HI LT ORNG CHLORAPREP

## (undated) DEVICE — NEEDLE INSUF L120MM ULT VERES ENDOPATH

## (undated) DEVICE — TROCAR: Brand: KII® SLEEVE

## (undated) DEVICE — GLOVE ORANGE PI 8   MSG9080

## (undated) DEVICE — GOWN,PREVENTION PLUS,XLN/2XL,ST,22/CS: Brand: MEDLINE

## (undated) DEVICE — DRAPE EQUIP TRNSPRT CONTAINMENT FOR BK TAB

## (undated) DEVICE — PACK,SET UP,DRAPE: Brand: MEDLINE

## (undated) DEVICE — BINDER ABD UNISX 9IN 45IN SM AND M UNIV

## (undated) DEVICE — DRAPE,LAP,CHOLE,W/TROUGHS,STERILE: Brand: MEDLINE